# Patient Record
Sex: MALE | Race: WHITE
[De-identification: names, ages, dates, MRNs, and addresses within clinical notes are randomized per-mention and may not be internally consistent; named-entity substitution may affect disease eponyms.]

---

## 2018-10-30 ENCOUNTER — HOSPITAL ENCOUNTER (OUTPATIENT)
Age: 63
End: 2018-10-30
Payer: COMMERCIAL

## 2018-10-30 DIAGNOSIS — Z12.2: ICD-10-CM

## 2018-10-30 DIAGNOSIS — F17.210: Primary | ICD-10-CM

## 2018-10-30 PROCEDURE — G0297 LDCT FOR LUNG CA SCREEN: HCPCS

## 2023-01-18 PROBLEM — M17.10 PRIMARY OSTEOARTHRITIS OF ONE KNEE: Status: ACTIVE | Noted: 2023-01-18

## 2023-01-18 PROBLEM — J30.89 PERENNIAL ALLERGIC RHINITIS: Status: ACTIVE | Noted: 2023-01-18

## 2023-01-18 PROBLEM — E66.3 OVERWEIGHT WITH BODY MASS INDEX (BMI) OF 27 TO 27.9 IN ADULT: Status: ACTIVE | Noted: 2023-01-18

## 2023-01-18 PROBLEM — G44.86 CERVICOGENIC HEADACHE: Status: ACTIVE | Noted: 2023-01-18

## 2023-01-18 PROBLEM — R53.83 FATIGUE: Status: ACTIVE | Noted: 2023-01-18

## 2023-01-18 PROBLEM — T78.40XA ALLERGIC REACTION TO DRUG: Status: ACTIVE | Noted: 2023-01-18

## 2023-01-18 PROBLEM — M80.00XA AGE-RELATED OSTEOPOROSIS WITH CURRENT PATHOLOGICAL FRACTURE, UNSPECIFIED SITE, INITIAL ENCOUNTER FOR FRACTURE: Status: ACTIVE | Noted: 2023-01-18

## 2023-01-18 PROBLEM — K21.9 HIATAL HERNIA WITH GERD: Status: ACTIVE | Noted: 2023-01-18

## 2023-01-18 PROBLEM — I10 HTN (HYPERTENSION): Status: ACTIVE | Noted: 2023-01-18

## 2023-01-18 PROBLEM — Z95.1 HISTORY OF CORONARY ARTERY BYPASS GRAFT: Status: ACTIVE | Noted: 2023-01-18

## 2023-01-18 PROBLEM — M54.9 BACK PAIN: Status: ACTIVE | Noted: 2023-01-18

## 2023-01-18 PROBLEM — M06.4 INFLAMMATORY POLYARTHRITIS (MULTI): Status: ACTIVE | Noted: 2023-01-18

## 2023-01-18 PROBLEM — G43.909 MIGRAINE HEADACHE: Status: ACTIVE | Noted: 2023-01-18

## 2023-01-18 PROBLEM — L28.2 PRURITIC RASH: Status: ACTIVE | Noted: 2023-01-18

## 2023-01-18 PROBLEM — R13.19 ESOPHAGEAL DYSPHAGIA: Status: ACTIVE | Noted: 2023-01-18

## 2023-01-18 PROBLEM — K43.2 INCISIONAL HERNIA WITHOUT OBSTRUCTION OR GANGRENE: Status: ACTIVE | Noted: 2023-01-18

## 2023-01-18 PROBLEM — J45.909 ASTHMA, MODERATE (HHS-HCC): Status: ACTIVE | Noted: 2023-01-18

## 2023-01-18 PROBLEM — Z98.890 HISTORY OF SHOULDER SURGERY: Status: ACTIVE | Noted: 2023-01-18

## 2023-01-18 PROBLEM — K52.9 CHRONIC DIARRHEA: Status: ACTIVE | Noted: 2023-01-18

## 2023-01-18 PROBLEM — F41.8 TEST ANXIETY: Status: ACTIVE | Noted: 2023-01-18

## 2023-01-18 PROBLEM — K44.9 HIATAL HERNIA WITH GERD: Status: ACTIVE | Noted: 2023-01-18

## 2023-01-18 PROBLEM — J32.9 RECURRENT SINUSITIS: Status: ACTIVE | Noted: 2023-01-18

## 2023-01-18 PROBLEM — F41.1 GENERALIZED ANXIETY DISORDER: Status: ACTIVE | Noted: 2023-01-18

## 2023-01-18 PROBLEM — L23.9 ALLERGIC DERMATITIS: Status: ACTIVE | Noted: 2023-01-18

## 2023-01-18 PROBLEM — Z98.890 HISTORY OF REPAIR OF HIATAL HERNIA: Status: ACTIVE | Noted: 2023-01-18

## 2023-01-18 PROBLEM — M06.9 RHEUMATOID ARTHRITIS (MULTI): Status: ACTIVE | Noted: 2023-01-18

## 2023-01-18 PROBLEM — R11.0 NAUSEA IN ADULT: Status: ACTIVE | Noted: 2023-01-18

## 2023-01-18 PROBLEM — K42.9 UMBILICAL HERNIA: Status: ACTIVE | Noted: 2023-01-18

## 2023-01-18 PROBLEM — Z87.19 HISTORY OF REPAIR OF HIATAL HERNIA: Status: ACTIVE | Noted: 2023-01-18

## 2023-01-18 PROBLEM — I48.91 ATRIAL FIBRILLATION (MULTI): Status: ACTIVE | Noted: 2023-01-18

## 2023-01-18 PROBLEM — R60.0 BILATERAL EDEMA OF LOWER EXTREMITY: Status: ACTIVE | Noted: 2023-01-18

## 2023-01-18 RX ORDER — CETIRIZINE HYDROCHLORIDE 10 MG/1
10 TABLET ORAL DAILY
COMMUNITY

## 2023-01-18 RX ORDER — HYDROXYZINE HYDROCHLORIDE 10 MG/1
10-20 TABLET, FILM COATED ORAL 3 TIMES DAILY PRN
COMMUNITY
End: 2023-04-27 | Stop reason: ALTCHOICE

## 2023-01-18 RX ORDER — LIDOCAINE 50 MG/G
OINTMENT TOPICAL 3 TIMES DAILY PRN
COMMUNITY
End: 2023-10-26 | Stop reason: SDUPTHER

## 2023-01-18 RX ORDER — MONTELUKAST SODIUM 10 MG/1
10 TABLET ORAL NIGHTLY
COMMUNITY
End: 2023-12-20 | Stop reason: SDUPTHER

## 2023-01-18 RX ORDER — DICLOFENAC SODIUM 10 MG/G
12-16 GEL TOPICAL SEE ADMIN INSTRUCTIONS
COMMUNITY
End: 2023-03-23 | Stop reason: ALTCHOICE

## 2023-01-18 RX ORDER — HYDROCODONE BITARTRATE AND ACETAMINOPHEN 7.5; 325 MG/1; MG/1
1 TABLET ORAL 4 TIMES DAILY
COMMUNITY
End: 2023-10-26 | Stop reason: SDUPTHER

## 2023-01-18 RX ORDER — GABAPENTIN 600 MG/1
1200 TABLET ORAL NIGHTLY
COMMUNITY
End: 2023-04-03 | Stop reason: SDUPTHER

## 2023-01-18 RX ORDER — LOSARTAN POTASSIUM 50 MG/1
50 TABLET ORAL DAILY
COMMUNITY
End: 2023-10-21 | Stop reason: SDUPTHER

## 2023-01-18 RX ORDER — DILTIAZEM HYDROCHLORIDE 120 MG/1
120 CAPSULE, COATED, EXTENDED RELEASE ORAL DAILY
COMMUNITY
End: 2023-06-28 | Stop reason: SDUPTHER

## 2023-01-18 RX ORDER — AZITHROMYCIN 250 MG/1
250 TABLET, FILM COATED ORAL SEE ADMIN INSTRUCTIONS
COMMUNITY
End: 2023-03-23 | Stop reason: ALTCHOICE

## 2023-01-18 RX ORDER — DIGOXIN 125 MCG
125 TABLET ORAL DAILY
COMMUNITY
End: 2023-10-26 | Stop reason: ALTCHOICE

## 2023-01-18 RX ORDER — PREDNISONE 10 MG/1
TABLET ORAL SEE ADMIN INSTRUCTIONS
COMMUNITY
End: 2023-07-25 | Stop reason: ALTCHOICE

## 2023-01-18 RX ORDER — MORPHINE SULFATE 15 MG/1
15 TABLET ORAL 2 TIMES DAILY PRN
COMMUNITY
End: 2023-10-26 | Stop reason: SDUPTHER

## 2023-01-18 RX ORDER — FUROSEMIDE 20 MG/1
20 TABLET ORAL DAILY
COMMUNITY

## 2023-01-18 RX ORDER — ATORVASTATIN CALCIUM 40 MG/1
40 TABLET, FILM COATED ORAL NIGHTLY
COMMUNITY
End: 2023-04-27 | Stop reason: SDUPTHER

## 2023-01-18 RX ORDER — LANOLIN ALCOHOL/MO/W.PET/CERES
1000 CREAM (GRAM) TOPICAL DAILY
COMMUNITY

## 2023-01-18 RX ORDER — TRIAMCINOLONE ACETONIDE 5 MG/G
CREAM TOPICAL SEE ADMIN INSTRUCTIONS
COMMUNITY
End: 2023-10-26 | Stop reason: ALTCHOICE

## 2023-01-18 RX ORDER — ASPIRIN 81 MG/1
81 TABLET ORAL DAILY
COMMUNITY
End: 2023-12-20 | Stop reason: WASHOUT

## 2023-01-18 RX ORDER — DIAZEPAM 5 MG/1
5 TABLET ORAL DAILY PRN
COMMUNITY
End: 2023-04-27 | Stop reason: SDUPTHER

## 2023-01-18 RX ORDER — PROMETHAZINE HYDROCHLORIDE 25 MG/1
25 TABLET ORAL EVERY 6 HOURS PRN
COMMUNITY

## 2023-01-18 RX ORDER — HYDROCORTISONE 25 MG/G
CREAM TOPICAL 2 TIMES DAILY
COMMUNITY

## 2023-03-14 ENCOUNTER — TELEPHONE (OUTPATIENT)
Dept: PRIMARY CARE | Facility: CLINIC | Age: 68
End: 2023-03-14
Payer: MEDICARE

## 2023-03-14 DIAGNOSIS — R94.2 ABNORMAL POSITRON EMISSION TOMOGRAPHY (PET) OF LUNG: ICD-10-CM

## 2023-03-14 DIAGNOSIS — R91.1 LUNG NODULE SEEN ON IMAGING STUDY: Primary | ICD-10-CM

## 2023-03-16 ENCOUNTER — TELEPHONE (OUTPATIENT)
Dept: PRIMARY CARE | Facility: CLINIC | Age: 68
End: 2023-03-16
Payer: MEDICARE

## 2023-03-16 NOTE — TELEPHONE ENCOUNTER
Stuff for cancer doctors needs to go to [facility]. Asking for a call back.

## 2023-03-17 ENCOUNTER — TELEPHONE (OUTPATIENT)
Dept: PRIMARY CARE | Facility: CLINIC | Age: 68
End: 2023-03-17
Payer: MEDICARE

## 2023-03-17 NOTE — TELEPHONE ENCOUNTER
PC regarding the referral for thoracic CA. Dr Barton @ [facility] does not do this type of surgery but Vishnu Lizama @ [facility] does [phone]. Please call patient back to discuss

## 2023-03-20 ENCOUNTER — TELEPHONE (OUTPATIENT)
Dept: PRIMARY CARE | Facility: CLINIC | Age: 68
End: 2023-03-20
Payer: MEDICARE

## 2023-03-20 NOTE — TELEPHONE ENCOUNTER
Yesi from Dr. Lizama office needs copies of CT and PET images and reports. Asking for a call back at [phone].

## 2023-03-23 ENCOUNTER — OFFICE VISIT (OUTPATIENT)
Dept: PRIMARY CARE | Facility: CLINIC | Age: 68
End: 2023-03-23
Payer: MEDICARE

## 2023-03-23 VITALS
BODY MASS INDEX: 28.46 KG/M2 | DIASTOLIC BLOOD PRESSURE: 84 MMHG | OXYGEN SATURATION: 94 % | WEIGHT: 171 LBS | SYSTOLIC BLOOD PRESSURE: 142 MMHG | HEART RATE: 91 BPM

## 2023-03-23 DIAGNOSIS — Z13.31 POSITIVE DEPRESSION SCREENING: ICD-10-CM

## 2023-03-23 DIAGNOSIS — J01.41 ACUTE RECURRENT PANSINUSITIS: Primary | ICD-10-CM

## 2023-03-23 DIAGNOSIS — R91.1 NODULE OF UPPER LOBE OF RIGHT LUNG: ICD-10-CM

## 2023-03-23 PROCEDURE — 99213 OFFICE O/P EST LOW 20 MIN: CPT | Performed by: FAMILY MEDICINE

## 2023-03-23 PROCEDURE — 3079F DIAST BP 80-89 MM HG: CPT | Performed by: FAMILY MEDICINE

## 2023-03-23 PROCEDURE — 3077F SYST BP >= 140 MM HG: CPT | Performed by: FAMILY MEDICINE

## 2023-03-23 PROCEDURE — 1160F RVW MEDS BY RX/DR IN RCRD: CPT | Performed by: FAMILY MEDICINE

## 2023-03-23 PROCEDURE — 1159F MED LIST DOCD IN RCRD: CPT | Performed by: FAMILY MEDICINE

## 2023-03-23 PROCEDURE — 1036F TOBACCO NON-USER: CPT | Performed by: FAMILY MEDICINE

## 2023-03-23 RX ORDER — SUCRALFATE 1 G/1
1 TABLET ORAL
COMMUNITY
Start: 2023-02-14 | End: 2023-10-26 | Stop reason: SDUPTHER

## 2023-03-23 RX ORDER — AMOXICILLIN AND CLAVULANATE POTASSIUM 875; 125 MG/1; MG/1
875 TABLET, FILM COATED ORAL 2 TIMES DAILY
Qty: 20 TABLET | Refills: 0 | Status: SHIPPED | OUTPATIENT
Start: 2023-03-23 | End: 2023-04-02

## 2023-03-23 ASSESSMENT — PATIENT HEALTH QUESTIONNAIRE - PHQ9
3. TROUBLE FALLING OR STAYING ASLEEP: NEARLY EVERY DAY
10. IF YOU CHECKED OFF ANY PROBLEMS, HOW DIFFICULT HAVE THESE PROBLEMS MADE IT FOR YOU TO DO YOUR WORK, TAKE CARE OF THINGS AT HOME, OR GET ALONG WITH OTHER PEOPLE: NOT DIFFICULT AT ALL
5. POOR APPETITE OR OVEREATING: NEARLY EVERY DAY
SUM OF ALL RESPONSES TO PHQ QUESTIONS 1-9: 18
2. FEELING DOWN, DEPRESSED OR HOPELESS: NEARLY EVERY DAY
4. FEELING TIRED OR HAVING LITTLE ENERGY: NEARLY EVERY DAY
6. FEELING BAD ABOUT YOURSELF - OR THAT YOU ARE A FAILURE OR HAVE LET YOURSELF OR YOUR FAMILY DOWN: NOT AT ALL
8. MOVING OR SPEAKING SO SLOWLY THAT OTHER PEOPLE COULD HAVE NOTICED. OR THE OPPOSITE, BEING SO FIGETY OR RESTLESS THAT YOU HAVE BEEN MOVING AROUND A LOT MORE THAN USUAL: NOT AT ALL
9. THOUGHTS THAT YOU WOULD BE BETTER OFF DEAD, OR OF HURTING YOURSELF: SEVERAL DAYS
SUM OF ALL RESPONSES TO PHQ9 QUESTIONS 1 AND 2: 6
7. TROUBLE CONCENTRATING ON THINGS, SUCH AS READING THE NEWSPAPER OR WATCHING TELEVISION: MORE THAN HALF THE DAYS
1. LITTLE INTEREST OR PLEASURE IN DOING THINGS: NEARLY EVERY DAY

## 2023-03-23 NOTE — PROGRESS NOTES
Subjective   Patient ID: Remy Abdi is a 67 y.o. male who presents for Follow-up (Bringing up green phlegm, getting nosebleeds and coughed up blood yesterday).    HPI   Had CT after ablation due to cough and found to have 16 mm consolidatiion. Recommended PET scan. Also mild axillary adenopathy. He does not feel that . Has pain across the chest. Has productive cough from lung. Had LYNX procedure. Still having chest pain in upper chest with activity but also with rest. Having a lot of bloating. Occasionally dysphagia. Bending over he gets pain up into the chest. May need to have it removed. Will try Pantoprazole. No blood in stools, melena, hemoptysis     BP had been running high. Will get Headache and nose bleed.     3/23/23  The PET scan did come back suspicious so he is now to see a surgeon.  They have the information and will contact him. Coughing up green mucus and getting PND.  He has this a lot. Has to use Vernors to break up mucus.  Now with nose bleeds at times and coughed up some blood.  He is on Protonix for the chest pain but not helping. Also has the Carafate. Does have food stick if he does not chew enough.  So after her addresses the nodule will get the Lynx out.  The chest pain comes and goes.  Has been on antibiotics about couple months ago.  Getting sweats. So will treat for the sinusitis. Augmentin for 10 days.      Depression is PHQ-9 with score of 15 with all that is going on.  So she does have thoughts of better off dead. But not suicidal. Has been on Cymbalta and made him tired.  So would prefer not to do that for now. Feels he just needs to get through this.  Worried about his wife and her diabetes          Review of Systems    Objective   /84 (BP Location: Left arm, Patient Position: Sitting)   Pulse 91   Wt 77.6 kg (171 lb)   SpO2 94%   BMI 28.46 kg/m²     Physical Exam  Constitutional:       Appearance: Normal appearance.   HENT:      Head: Normocephalic.      Right Ear:  Tympanic membrane, ear canal and external ear normal.      Left Ear: Tympanic membrane, ear canal and external ear normal.      Nose: Nose normal.      Mouth/Throat:      Mouth: Mucous membranes are moist.      Pharynx: Oropharynx is clear.      Comments: Dentures   Eyes:      Extraocular Movements: Extraocular movements intact.      Conjunctiva/sclera: Conjunctivae normal.      Pupils: Pupils are equal, round, and reactive to light.   Cardiovascular:      Rate and Rhythm: Normal rate and regular rhythm.   Pulmonary:      Effort: Pulmonary effort is normal.      Breath sounds: Normal breath sounds.   Abdominal:      General: Abdomen is flat.      Palpations: Abdomen is soft.      Tenderness: There is abdominal tenderness (epigastrium).   Musculoskeletal:      Cervical back: Normal range of motion and neck supple.   Neurological:      General: No focal deficit present.      Mental Status: He is alert and oriented to person, place, and time.   Psychiatric:         Mood and Affect: Mood normal.         Behavior: Behavior normal.         Thought Content: Thought content normal.         Judgment: Judgment normal.         Assessment/Plan   Problem List Items Addressed This Visit       Positive depression screening     Other Visit Diagnoses       Acute recurrent pansinusitis    -  Primary    Nodule of upper lobe of right lung

## 2023-04-03 ENCOUNTER — TELEPHONE (OUTPATIENT)
Dept: PRIMARY CARE | Facility: CLINIC | Age: 68
End: 2023-04-03
Payer: MEDICARE

## 2023-04-03 DIAGNOSIS — I48.91 ATRIAL FIBRILLATION, UNSPECIFIED TYPE (MULTI): ICD-10-CM

## 2023-04-03 DIAGNOSIS — M54.9 BACK PAIN, UNSPECIFIED BACK LOCATION, UNSPECIFIED BACK PAIN LATERALITY, UNSPECIFIED CHRONICITY: ICD-10-CM

## 2023-04-03 RX ORDER — GABAPENTIN 600 MG/1
1200 TABLET ORAL NIGHTLY
Qty: 180 TABLET | Refills: 1 | Status: SHIPPED | OUTPATIENT
Start: 2023-04-03 | End: 2023-12-20 | Stop reason: SDUPTHER

## 2023-04-27 ENCOUNTER — OFFICE VISIT (OUTPATIENT)
Dept: PRIMARY CARE | Facility: CLINIC | Age: 68
End: 2023-04-27
Payer: MEDICARE

## 2023-04-27 VITALS
DIASTOLIC BLOOD PRESSURE: 78 MMHG | BODY MASS INDEX: 27.96 KG/M2 | WEIGHT: 168 LBS | HEART RATE: 84 BPM | SYSTOLIC BLOOD PRESSURE: 122 MMHG | OXYGEN SATURATION: 96 %

## 2023-04-27 DIAGNOSIS — M62.838 MUSCLE SPASM: ICD-10-CM

## 2023-04-27 DIAGNOSIS — J32.9 RECURRENT SINUSITIS: ICD-10-CM

## 2023-04-27 DIAGNOSIS — M05.79 RHEUMATOID ARTHRITIS INVOLVING MULTIPLE SITES WITH POSITIVE RHEUMATOID FACTOR (MULTI): ICD-10-CM

## 2023-04-27 DIAGNOSIS — G43.709 CHRONIC MIGRAINE WITHOUT AURA WITHOUT STATUS MIGRAINOSUS, NOT INTRACTABLE: ICD-10-CM

## 2023-04-27 DIAGNOSIS — C34.92 NSCLC OF LEFT LUNG (MULTI): ICD-10-CM

## 2023-04-27 DIAGNOSIS — F33.9 DEPRESSION, RECURRENT (CMS-HCC): Primary | ICD-10-CM

## 2023-04-27 DIAGNOSIS — K44.9 HIATAL HERNIA WITH GERD: ICD-10-CM

## 2023-04-27 DIAGNOSIS — K21.9 HIATAL HERNIA WITH GERD: ICD-10-CM

## 2023-04-27 DIAGNOSIS — E78.00 HYPERCHOLESTEROLEMIA: ICD-10-CM

## 2023-04-27 PROBLEM — Z13.31 POSITIVE DEPRESSION SCREENING: Status: RESOLVED | Noted: 2023-03-23 | Resolved: 2023-04-27

## 2023-04-27 PROCEDURE — 1159F MED LIST DOCD IN RCRD: CPT | Performed by: FAMILY MEDICINE

## 2023-04-27 PROCEDURE — 1160F RVW MEDS BY RX/DR IN RCRD: CPT | Performed by: FAMILY MEDICINE

## 2023-04-27 PROCEDURE — 99214 OFFICE O/P EST MOD 30 MIN: CPT | Performed by: FAMILY MEDICINE

## 2023-04-27 PROCEDURE — 3074F SYST BP LT 130 MM HG: CPT | Performed by: FAMILY MEDICINE

## 2023-04-27 PROCEDURE — 1036F TOBACCO NON-USER: CPT | Performed by: FAMILY MEDICINE

## 2023-04-27 PROCEDURE — 3078F DIAST BP <80 MM HG: CPT | Performed by: FAMILY MEDICINE

## 2023-04-27 RX ORDER — TRAZODONE HYDROCHLORIDE 50 MG/1
50 TABLET ORAL NIGHTLY
Qty: 30 TABLET | Refills: 1 | Status: SHIPPED | OUTPATIENT
Start: 2023-04-27 | End: 2023-07-25 | Stop reason: ALTCHOICE

## 2023-04-27 RX ORDER — DIAZEPAM 5 MG/1
5 TABLET ORAL DAILY PRN
Qty: 30 TABLET | Refills: 0 | Status: SHIPPED | OUTPATIENT
Start: 2023-04-27 | End: 2023-06-06 | Stop reason: SDUPTHER

## 2023-04-27 RX ORDER — NITROGLYCERIN 0.4 MG/1
0.4 TABLET SUBLINGUAL EVERY 5 MIN PRN
COMMUNITY
Start: 2022-06-22 | End: 2023-10-26 | Stop reason: SDUPTHER

## 2023-04-27 RX ORDER — ATORVASTATIN CALCIUM 40 MG/1
40 TABLET, FILM COATED ORAL NIGHTLY
Qty: 90 TABLET | Refills: 3 | Status: SHIPPED | OUTPATIENT
Start: 2023-04-27 | End: 2024-01-30 | Stop reason: SDUPTHER

## 2023-04-27 NOTE — PROGRESS NOTES
Subjective   Patient ID: Remy Abdi is a 67 y.o. male who presents for Follow-up (Discuss lung biopsy result).    HPI Had CT after ablation due to cough and found to have 16 mm consolidatiion. Recommended PET scan. Also mild axillary adenopathy. He does not feel that . Has pain across the chest. Has productive cough from lung. Had LYNX procedure. Still having chest pain in upper chest with activity but also with rest. Having a lot of bloating. Occasionally dysphagia. Bending over he gets pain up into the chest. May need to have it removed. Will try Pantoprazole. No blood in stools, melena, hemoptysis      BP had been running high. Will get Headache and nose bleed.      3/23/23  The PET scan did come back suspicious so he is now to see a surgeon.  They have the information and will contact him. Coughing up green mucus and getting PND.  He has this a lot. Has to use Vernors to break up mucus.  Now with nose bleeds at times and coughed up some blood.  He is on Protonix for the chest pain but not helping. Also has the Carafate. Does have food stick if he does not chew enough.  So after her addresses the nodule will get the Lynx out.  The chest pain comes and goes.  Has been on antibiotics about couple months ago.  Getting sweats. So will treat for the sinusitis. Augmentin for 10 days.       Depression is PHQ-9 with score of 15 with all that is going on.  So she does have thoughts of better off dead. But not suicidal. Has been on Cymbalta and made him tired.  So would prefer not to do that for now. Feels he just needs to get through this.  Worried about his wife and her diabetes      4/27/23  Had biopsy and now with cancer.  So to have lobectomy in the near future., Felt to be aggressive.. But not expecting radiation or chemo.  No resting well with all that is going on.  Would like to sleep better so will try him on Trazodone again.   Has Valium to use as needed. Hopefully less with the medication for sleep.     RA -  on steroids for last 3 days as knees and hips freezing up. Cannot take biologics as his sister developed cancer.  Was having issues.     Recurrent sinusitis - has had allergies acting up with the spring. On prednisone. Getting migraines more and had Nurtec to take    HH and GERD with repair -still needs to get LINX out.. Still pain and some dysphagia. Still hoarse.     Afib doing well.     UDS 12/5/22  CSA 12/5/22    Review of Systems    Objective   /78 (BP Location: Left arm, Patient Position: Sitting)   Pulse 84   Wt 76.2 kg (168 lb)   SpO2 96%   BMI 27.96 kg/m²     Physical Exam  Vitals reviewed.   Constitutional:       General: He is not in acute distress.     Appearance: Normal appearance.   HENT:      Head: Normocephalic.      Right Ear: Tympanic membrane normal.      Left Ear: Tympanic membrane normal.      Nose: Nose normal.      Mouth/Throat:      Pharynx: Oropharynx is clear.      Comments: Upper and lower dentures   Eyes:      Extraocular Movements: Extraocular movements intact.      Conjunctiva/sclera: Conjunctivae normal.      Pupils: Pupils are equal, round, and reactive to light.   Neck:      Vascular: No carotid bruit.   Cardiovascular:      Rate and Rhythm: Normal rate and regular rhythm.      Pulses: Normal pulses.      Heart sounds: Normal heart sounds. No murmur heard.  Pulmonary:      Effort: Pulmonary effort is normal. No respiratory distress.      Breath sounds: Normal breath sounds.   Abdominal:      General: Abdomen is flat. Bowel sounds are normal. There is no distension.      Palpations: Abdomen is soft. There is no mass.      Tenderness: There is abdominal tenderness (epigastric).   Musculoskeletal:      Cervical back: Normal range of motion and neck supple. No tenderness.   Lymphadenopathy:      Cervical: No cervical adenopathy.   Skin:     General: Skin is warm and dry.      Findings: No rash.   Neurological:      General: No focal deficit present.      Mental Status: He is  alert and oriented to person, place, and time.   Psychiatric:         Mood and Affect: Mood normal.         Thought Content: Thought content normal.         Judgment: Judgment normal.         Assessment/Plan   Problem List Items Addressed This Visit       Depression, recurrent (CMS/HCC) - Primary    Relevant Medications    traZODone (Desyrel) 50 mg tablet    Hiatal hernia with GERD    Migraine headache    NSCLC of left lung (CMS/HCC)    Recurrent sinusitis    Rheumatoid arthritis (CMS/HCC)     Other Visit Diagnoses       Muscle spasm        Relevant Medications    diazePAM (Valium) 5 mg tablet    Hypercholesterolemia        Relevant Medications    atorvastatin (Lipitor) 40 mg tablet

## 2023-05-13 PROBLEM — R14.2 GASEOUS REGURGITATION: Status: ACTIVE | Noted: 2019-10-21

## 2023-05-15 ENCOUNTER — OFFICE VISIT (OUTPATIENT)
Dept: PRIMARY CARE | Facility: CLINIC | Age: 68
End: 2023-05-15
Payer: MEDICARE

## 2023-05-15 VITALS
WEIGHT: 167 LBS | HEART RATE: 77 BPM | SYSTOLIC BLOOD PRESSURE: 132 MMHG | BODY MASS INDEX: 27.79 KG/M2 | DIASTOLIC BLOOD PRESSURE: 86 MMHG | OXYGEN SATURATION: 97 %

## 2023-05-15 DIAGNOSIS — M19.011 ARTHRITIS OF RIGHT ACROMIOCLAVICULAR JOINT: Primary | ICD-10-CM

## 2023-05-15 PROCEDURE — 20610 DRAIN/INJ JOINT/BURSA W/O US: CPT | Performed by: FAMILY MEDICINE

## 2023-05-15 PROCEDURE — 1160F RVW MEDS BY RX/DR IN RCRD: CPT | Performed by: FAMILY MEDICINE

## 2023-05-15 PROCEDURE — 3075F SYST BP GE 130 - 139MM HG: CPT | Performed by: FAMILY MEDICINE

## 2023-05-15 PROCEDURE — 99212 OFFICE O/P EST SF 10 MIN: CPT | Performed by: FAMILY MEDICINE

## 2023-05-15 PROCEDURE — 1159F MED LIST DOCD IN RCRD: CPT | Performed by: FAMILY MEDICINE

## 2023-05-15 PROCEDURE — 3079F DIAST BP 80-89 MM HG: CPT | Performed by: FAMILY MEDICINE

## 2023-05-15 PROCEDURE — 1036F TOBACCO NON-USER: CPT | Performed by: FAMILY MEDICINE

## 2023-05-15 RX ORDER — TRIAMCINOLONE ACETONIDE 40 MG/ML
40 INJECTION, SUSPENSION INTRA-ARTICULAR; INTRAMUSCULAR
Status: COMPLETED | OUTPATIENT
Start: 2023-05-15 | End: 2023-05-15

## 2023-05-15 RX ORDER — LIDOCAINE HYDROCHLORIDE 10 MG/ML
2 INJECTION INFILTRATION; PERINEURAL
Status: COMPLETED | OUTPATIENT
Start: 2023-05-15 | End: 2023-05-15

## 2023-05-15 RX ADMIN — LIDOCAINE HYDROCHLORIDE 2 ML: 10 INJECTION INFILTRATION; PERINEURAL at 10:02

## 2023-05-15 RX ADMIN — TRIAMCINOLONE ACETONIDE 40 MG: 40 INJECTION, SUSPENSION INTRA-ARTICULAR; INTRAMUSCULAR at 10:02

## 2023-05-15 ASSESSMENT — ENCOUNTER SYMPTOMS: PAIN: 1

## 2023-05-15 NOTE — PROGRESS NOTES
Subjective   Patient ID: Remy Abdi is a 67 y.o. male who presents for Pain (Problems with right shoulder pain).    Pain     Having a lot of pain in the right shoulder.  Had an injection 4 days ago which has not helped. Needs replacement on the left. Has been taking low dose prednisone. Pain is more anterior. Has had rotator cuff on left side. Pain with abduction     Review of Systems    Objective   /86 (BP Location: Left arm, Patient Position: Sitting)   Pulse 77   Wt 75.8 kg (167 lb)   SpO2 97%   BMI 27.79 kg/m²     Physical Exam He has pain in right shoulder with abduction.  Mildly positive Grajeda and Neer.  Tenderness though is right over the AC joint.  No tenderness over the biceps tendon.  No swelling or redness.Patient ID: Remy Abdi is a 67 y.o. male.    Joint Injection Large/Arthrocentesis (Right AC joint) on 5/15/2023 10:02 AM  Indications: pain  Details: superior approach  Medications: 40 mg triamcinolone acetonide 40 mg/mL; 2 mL lidocaine 10 mg/mL (1 %)  Outcome: tolerated well, no immediate complications  Procedure, treatment alternatives, risks and benefits explained, specific risks discussed. Patient was prepped and draped in the usual sterile fashion.         Assessment/Plan   Problem List Items Addressed This Visit    None  Visit Diagnoses       Arthritis of right acromioclavicular joint    -  Primary    Relevant Medications    lidocaine (Xylocaine) 10 mg/mL (1 %) injection 2 mL (Completed)    triamcinolone acetonide (Kenalog-40) injection 40 mg (Completed)    Other Relevant Orders    Joint Injection Large/Arthrocentesis (Completed)

## 2023-06-02 PROBLEM — C34.32: Status: ACTIVE | Noted: 2023-05-25

## 2023-06-02 RX ORDER — AMOXICILLIN 250 MG
1 CAPSULE ORAL 2 TIMES DAILY
Qty: 60 TABLET | Refills: 0 | COMMUNITY
Start: 2023-05-26 | End: 2023-06-25

## 2023-06-02 RX ORDER — NALOXONE HYDROCHLORIDE 4 MG/.1ML
4 SPRAY NASAL AS NEEDED
COMMUNITY
Start: 2023-05-26

## 2023-06-06 ENCOUNTER — TELEPHONE (OUTPATIENT)
Dept: PRIMARY CARE | Facility: CLINIC | Age: 68
End: 2023-06-06
Payer: MEDICARE

## 2023-06-06 DIAGNOSIS — M62.838 MUSCLE SPASM: ICD-10-CM

## 2023-06-06 RX ORDER — DIAZEPAM 5 MG/1
5 TABLET ORAL DAILY PRN
Qty: 30 TABLET | Refills: 0 | Status: SHIPPED | OUTPATIENT
Start: 2023-06-06 | End: 2023-07-25 | Stop reason: SDUPTHER

## 2023-06-06 NOTE — TELEPHONE ENCOUNTER
Needing refill of diazepam sent to [facility] pharmacy.   
Rx proposed   
no peripheral edema/no orthopnea/no chest pain/no palpitations/no dyspnea on exertion

## 2023-06-28 ENCOUNTER — OFFICE VISIT (OUTPATIENT)
Dept: PRIMARY CARE | Facility: CLINIC | Age: 68
End: 2023-06-28
Payer: MEDICARE

## 2023-06-28 VITALS
OXYGEN SATURATION: 95 % | HEART RATE: 87 BPM | SYSTOLIC BLOOD PRESSURE: 128 MMHG | DIASTOLIC BLOOD PRESSURE: 76 MMHG | BODY MASS INDEX: 27.62 KG/M2 | WEIGHT: 166 LBS
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
## 2023-06-28 DIAGNOSIS — I48.91 ATRIAL FIBRILLATION, UNSPECIFIED TYPE (MULTI): ICD-10-CM

## 2023-06-28 DIAGNOSIS — F33.9 DEPRESSION, RECURRENT (CMS-HCC): Primary | ICD-10-CM

## 2023-06-28 DIAGNOSIS — C34.92 NSCLC OF LEFT LUNG (MULTI): ICD-10-CM

## 2023-06-28 DIAGNOSIS — S23.41XA SPRAIN OF COSTAL CARTILAGE, INITIAL ENCOUNTER: ICD-10-CM

## 2023-06-28 PROCEDURE — 1160F RVW MEDS BY RX/DR IN RCRD: CPT | Performed by: FAMILY MEDICINE

## 2023-06-28 PROCEDURE — 99214 OFFICE O/P EST MOD 30 MIN: CPT | Performed by: FAMILY MEDICINE

## 2023-06-28 PROCEDURE — 1159F MED LIST DOCD IN RCRD: CPT | Performed by: FAMILY MEDICINE

## 2023-06-28 PROCEDURE — 3074F SYST BP LT 130 MM HG: CPT | Performed by: FAMILY MEDICINE

## 2023-06-28 PROCEDURE — 3078F DIAST BP <80 MM HG: CPT | Performed by: FAMILY MEDICINE

## 2023-06-28 PROCEDURE — 1036F TOBACCO NON-USER: CPT | Performed by: FAMILY MEDICINE

## 2023-06-28 RX ORDER — DILTIAZEM HYDROCHLORIDE 120 MG/1
120 CAPSULE, COATED, EXTENDED RELEASE ORAL DAILY
Qty: 90 CAPSULE | Refills: 2 | Status: SHIPPED | OUTPATIENT
Start: 2023-06-28

## 2023-06-28 RX ORDER — DULOXETIN HYDROCHLORIDE 30 MG/1
CAPSULE, DELAYED RELEASE ORAL
Qty: 42 CAPSULE | Refills: 0 | Status: SHIPPED | OUTPATIENT
Start: 2023-06-28 | End: 2023-10-26 | Stop reason: ALTCHOICE

## 2023-06-28 NOTE — PROGRESS NOTES
Subjective   Patient ID: Remy Abdi is a 67 y.o. male who presents for Follow-up.    HPI  2/223/23   Had CT after ablation due to cough and found to have 16 mm consolidatiion. Recommended PET scan. Also mild axillary adenopathy. He does not feel that . Has pain across the chest. Has productive cough from lung. Had LYNX procedure. Still having chest pain in upper chest with activity but also with rest. Having a lot of bloating. Occasionally dysphagia. Bending over he gets pain up into the chest. May need to have it removed. Will try Pantoprazole. No blood in stools, melena, hemoptysis      BP had been running high. Will get Headache and nose bleed.      3/23/23  The PET scan did come back suspicious so he is now to see a surgeon.  They have the information and will contact him. Coughing up green mucus and getting PND.  He has this a lot. Has to use Vernors to break up mucus.  Now with nose bleeds at times and coughed up some blood.  He is on Protonix for the chest pain but not helping. Also has the Carafate. Does have food stick if he does not chew enough.  So after her addresses the nodule will get the Lynx out.  The chest pain comes and goes.  Has been on antibiotics about couple months ago.  Getting sweats. So will treat for the sinusitis. Augmentin for 10 days.       Depression is PHQ-9 with score of 15 with all that is going on.  So she does have thoughts of better off dead. But not suicidal. Has been on Cymbalta and made him tired.  So would prefer not to do that for now. Feels he just needs to get through this.  Worried about his wife and her diabetes       4/27/23  Had biopsy and now with cancer.  So to have lobectomy in the near future., Felt to be aggressive.. But not expecting radiation or chemo.  No resting well with all that is going on.  Would like to sleep better so will try him on Trazodone again.   Has Valium to use as needed. Hopefully less with the medication for sleep.      RA - on steroids for  last 3 days as knees and hips freezing up. Cannot take biologics as his sister developed cancer.  Was having issues.      Recurrent sinusitis - has had allergies acting up with the spring. On prednisone. Getting migraines more and had Nurtec to take     HH and GERD with repair -still needs to get LINX out.. Still pain and some dysphagia. Still hoarse.      Afib doing well.     6/28/23  Has surgery by scope and not able to remove the cancer. So to have radiation. To start in about 2 weeks after healing.   He had a fall about 3 days ago. Walking off of the deck and one foot caught on the floor. Fell down on palms of hands, knee, abdomen. Now cannot bend down due to pain in the ribs. Has abrasion on knees. They do not hurt much. Hypothenar discolored. No abrasion. Does have one on the left forearm due to the watch. Mainly sore right ribs.    His wife would like him treated for anxiety as he is more irritable. Sad and blue with all that has happened.  Valium does help with the rib spasm and restlessness. Trazodone helps him sleep but gives hangover. Cymbalta got to where it was not working in the past. Does have intolerance to multiple SSRIs so will go back to Cymbalta.     Still needs to have LINX out but will need to have radiation.        UDS 12/5/22  CSA 12/5/22    Review of Systems    Objective   /76 (BP Location: Left arm, Patient Position: Sitting)   Pulse 87   Wt 75.3 kg (166 lb)   SpO2 95%   BMI 27.62 kg/m²     Physical Exam  Constitutional:       Appearance: Normal appearance. He is normal weight.   Cardiovascular:      Rate and Rhythm: Normal rate and regular rhythm.      Pulses: Normal pulses.      Heart sounds: No murmur heard.  Pulmonary:      Effort: Pulmonary effort is normal. No respiratory distress.      Breath sounds: Normal breath sounds. No wheezing.   Chest:      Chest wall: Tenderness (right rib margin. No deformity) present.   Abdominal:      General: Abdomen is flat. There is no  distension.      Palpations: Abdomen is soft. There is no mass.      Tenderness: There is no abdominal tenderness.   Skin:     General: Skin is warm and dry.      Comments: Well healed scars on the left chest    Neurological:      Mental Status: He is alert.         Assessment/Plan   Problem List Items Addressed This Visit       Atrial fibrillation (CMS/HCC)    Relevant Medications    dilTIAZem CD (Cardizem CD) 120 mg 24 hr capsule    Depression, recurrent (CMS/HCC) - Primary    Relevant Medications    DULoxetine (Cymbalta) 30 mg DR capsule    Other Relevant Orders    Follow Up In Primary Care - Established    NSCLC of left lung (CMS/HCC)     Other Visit Diagnoses       Fall, initial encounter        Sprain of costal cartilage, initial encounter

## 2023-07-10 ENCOUNTER — TELEPHONE (OUTPATIENT)
Dept: PRIMARY CARE | Facility: CLINIC | Age: 68
End: 2023-07-10
Payer: MEDICARE

## 2023-07-10 NOTE — TELEPHONE ENCOUNTER
Pt dropped off paperwork that needs to be completed for his handicap plates. Birthday is on [birth year], if we could have these forms completed today or tomorrow that would be great.    Please call pt at [phone] when completed.

## 2023-07-25 ENCOUNTER — OFFICE VISIT (OUTPATIENT)
Dept: PRIMARY CARE | Facility: CLINIC | Age: 68
End: 2023-07-25
Payer: MEDICARE

## 2023-07-25 VITALS
SYSTOLIC BLOOD PRESSURE: 122 MMHG | BODY MASS INDEX: 27.62 KG/M2 | WEIGHT: 166 LBS | OXYGEN SATURATION: 96 % | DIASTOLIC BLOOD PRESSURE: 82 MMHG | HEART RATE: 87 BPM

## 2023-07-25 DIAGNOSIS — F41.1 GENERALIZED ANXIETY DISORDER: ICD-10-CM

## 2023-07-25 DIAGNOSIS — K21.9 HIATAL HERNIA WITH GERD: Primary | ICD-10-CM

## 2023-07-25 DIAGNOSIS — M62.838 MUSCLE SPASM: ICD-10-CM

## 2023-07-25 DIAGNOSIS — C34.92 NSCLC OF LEFT LUNG (MULTI): ICD-10-CM

## 2023-07-25 DIAGNOSIS — I48.0 PAROXYSMAL ATRIAL FIBRILLATION (MULTI): ICD-10-CM

## 2023-07-25 DIAGNOSIS — K44.9 HIATAL HERNIA WITH GERD: Primary | ICD-10-CM

## 2023-07-25 DIAGNOSIS — F33.9 DEPRESSION, RECURRENT (CMS-HCC): ICD-10-CM

## 2023-07-25 PROCEDURE — 99214 OFFICE O/P EST MOD 30 MIN: CPT | Performed by: FAMILY MEDICINE

## 2023-07-25 PROCEDURE — 1160F RVW MEDS BY RX/DR IN RCRD: CPT | Performed by: FAMILY MEDICINE

## 2023-07-25 PROCEDURE — 3074F SYST BP LT 130 MM HG: CPT | Performed by: FAMILY MEDICINE

## 2023-07-25 PROCEDURE — 3079F DIAST BP 80-89 MM HG: CPT | Performed by: FAMILY MEDICINE

## 2023-07-25 PROCEDURE — 1159F MED LIST DOCD IN RCRD: CPT | Performed by: FAMILY MEDICINE

## 2023-07-25 PROCEDURE — 1036F TOBACCO NON-USER: CPT | Performed by: FAMILY MEDICINE

## 2023-07-25 RX ORDER — DIAZEPAM 5 MG/1
5 TABLET ORAL DAILY PRN
Qty: 30 TABLET | Refills: 0 | Status: SHIPPED | OUTPATIENT
Start: 2023-07-25 | End: 2023-09-08 | Stop reason: SDUPTHER

## 2023-07-25 RX ORDER — PANTOPRAZOLE SODIUM 40 MG/1
40 TABLET, DELAYED RELEASE ORAL DAILY
Qty: 90 TABLET | Refills: 3 | Status: SHIPPED | OUTPATIENT
Start: 2023-07-25 | End: 2024-01-30 | Stop reason: SDUPTHER

## 2023-07-25 NOTE — PROGRESS NOTES
Subjective   Patient ID: Remy Abdi is a 68 y.o. male who presents for Follow-up.    HPI   2/22/23   Had CT after ablation due to cough and found to have 16 mm consolidatiion. Recommended PET scan. Also mild axillary adenopathy. He does not feel that . Has pain across the chest. Has productive cough from lung. Had LYNX procedure. Still having chest pain in upper chest with activity but also with rest. Having a lot of bloating. Occasionally dysphagia. Bending over he gets pain up into the chest. May need to have it removed. Will try Pantoprazole. No blood in stools, melena, hemoptysis      BP had been running high. Will get Headache and nose bleed.      3/23/23  The PET scan did come back suspicious so he is now to see a surgeon.  They have the information and will contact him. Coughing up green mucus and getting PND.  He has this a lot. Has to use Vernors to break up mucus.  Now with nose bleeds at times and coughed up some blood.  He is on Protonix for the chest pain but not helping. Also has the Carafate. Does have food stick if he does not chew enough.  So after her addresses the nodule will get the Lynx out.  The chest pain comes and goes.  Has been on antibiotics about couple months ago.  Getting sweats. So will treat for the sinusitis. Augmentin for 10 days.       Depression is PHQ-9 with score of 15 with all that is going on.  So she does have thoughts of better off dead. But not suicidal. Has been on Cymbalta and made him tired.  So would prefer not to do that for now. Feels he just needs to get through this.  Worried about his wife and her diabetes       4/27/23  Had biopsy and now with cancer.  So to have lobectomy in the near future., Felt to be aggressive.. But not expecting radiation or chemo.  No resting well with all that is going on.  Would like to sleep better so will try him on Trazodone again.   Has Valium to use as needed. Hopefully less with the medication for sleep.      RA - on steroids for  last 3 days as knees and hips freezing up. Cannot take biologics as his sister developed cancer.  Was having issues.      Recurrent sinusitis - has had allergies acting up with the spring. On prednisone. Getting migraines more and had Nurtec to take     HH and GERD with repair -still needs to get LINX out.. Still pain and some dysphagia. Still hoarse.      Afib doing well.      6/28/23  Has surgery by scope and not able to remove the cancer. So to have radiation. To start in about 2 weeks after healing.   He had a fall about 3 days ago. Walking off of the deck and one foot caught on the floor. Fell down on palms of hands, knee, abdomen. Now cannot bend down due to pain in the ribs. Has abrasion on knees. They do not hurt much. Hypothenar discolored. No abrasion. Does have one on the left forearm due to the watch. Mainly sore right ribs.    His wife would like him treated for anxiety as he is more irritable. Sad and blue with all that has happened.  Valium does help with the rib spasm and restlessness. Trazodone helps him sleep but gives hangover. Cymbalta got to where it was not working in the past. Does have intolerance to multiple SSRIs so will go back to Cymbalta.     Still needs to have LINX out but will need to have radiation.     7/25/23  Needs refill on Valium. Does not take daily. As needed to relax for testing or procedures or dealing with diagnoses. Works well for this and spasms with injuries ,. OARRS consistent. Does not drive.   Done with radiation. To have scan in 3 months. Very tired with this. The pain above is doing better. Off Trazodone.  Off Cymbalta as it was making him tired. May try again after the effects of the radiation wear off.   Has GERD symptoms. Protonix and Sucralfate help.      Afib doing well.. No chest pain other than ribs. No further falls        UDS 12/5/22  CSA 12/5/22  I have personally reviewed the patients OARRS report. I have considered the risks of abuse, addiction and  diversion. I believe it is clinically appropriate to continue to prescribe this medication.     Review of Systems    Objective   /82 (BP Location: Left arm, Patient Position: Sitting)   Pulse 87   Wt 75.3 kg (166 lb)   SpO2 96%   BMI 27.62 kg/m²     Physical Exam  Constitutional:       General: He is not in acute distress.     Appearance: Normal appearance.   Cardiovascular:      Rate and Rhythm: Normal rate and regular rhythm.      Heart sounds: No murmur heard.  Pulmonary:      Effort: Pulmonary effort is normal. No respiratory distress.      Breath sounds: Normal breath sounds. No wheezing.   Chest:      Chest wall: No tenderness.   Musculoskeletal:      Right lower leg: No edema.      Left lower leg: No edema.   Neurological:      Mental Status: He is alert.   Psychiatric:         Mood and Affect: Mood normal.         Behavior: Behavior normal.         Thought Content: Thought content normal.         Judgment: Judgment normal.           Assessment/Plan   Problem List Items Addressed This Visit       Atrial fibrillation (CMS/HCC)    Depression, recurrent (CMS/HCC)    Generalized anxiety disorder    Hiatal hernia with GERD - Primary    Relevant Medications    pantoprazole (ProtoNix) 40 mg EC tablet    Other Relevant Orders    Follow Up In Primary Care - Established    NSCLC of left lung (CMS/HCC)     Other Visit Diagnoses       Muscle spasm        Relevant Medications    diazePAM (Valium) 5 mg tablet

## 2023-09-08 ENCOUNTER — TELEPHONE (OUTPATIENT)
Dept: PRIMARY CARE | Facility: CLINIC | Age: 68
End: 2023-09-08
Payer: MEDICARE

## 2023-09-08 DIAGNOSIS — M62.838 MUSCLE SPASM: ICD-10-CM

## 2023-09-08 RX ORDER — DIAZEPAM 5 MG/1
5 TABLET ORAL DAILY PRN
Qty: 30 TABLET | Refills: 0 | Status: SHIPPED | OUTPATIENT
Start: 2023-09-08 | End: 2023-09-12 | Stop reason: SDUPTHER

## 2023-09-12 ENCOUNTER — TELEPHONE (OUTPATIENT)
Dept: PRIMARY CARE | Facility: CLINIC | Age: 68
End: 2023-09-12
Payer: MEDICARE

## 2023-09-12 DIAGNOSIS — M62.838 MUSCLE SPASM: ICD-10-CM

## 2023-09-12 RX ORDER — DIAZEPAM 5 MG/1
5 TABLET ORAL DAILY PRN
Qty: 30 TABLET | Refills: 0 | Status: SHIPPED | OUTPATIENT
Start: 2023-09-12 | End: 2023-09-12

## 2023-09-12 NOTE — TELEPHONE ENCOUNTER
Needs a refill on diazepam 5mg sent to [facility] pharmacy- states he called a week ago and didn't receive- will need asap

## 2023-10-21 ENCOUNTER — PHARMACY VISIT (OUTPATIENT)
Dept: PHARMACY | Facility: CLINIC | Age: 68
End: 2023-10-21
Payer: MEDICARE

## 2023-10-21 ENCOUNTER — TELEPHONE (OUTPATIENT)
Dept: PRIMARY CARE | Facility: CLINIC | Age: 68
End: 2023-10-21
Payer: MEDICARE

## 2023-10-21 DIAGNOSIS — I10 PRIMARY HYPERTENSION: Primary | ICD-10-CM

## 2023-10-21 DIAGNOSIS — I10 PRIMARY HYPERTENSION: ICD-10-CM

## 2023-10-21 PROCEDURE — RXMED WILLOW AMBULATORY MEDICATION CHARGE

## 2023-10-21 RX ORDER — LOSARTAN POTASSIUM 50 MG/1
50 TABLET ORAL DAILY
Qty: 90 TABLET | Refills: 3 | Status: SHIPPED | OUTPATIENT
Start: 2023-10-21 | End: 2023-10-26 | Stop reason: SDUPTHER

## 2023-10-21 RX ORDER — LOSARTAN POTASSIUM 50 MG/1
50 TABLET ORAL DAILY
Qty: 7 TABLET | Refills: 0 | Status: SHIPPED | OUTPATIENT
Start: 2023-10-21 | End: 2023-10-26 | Stop reason: SDUPTHER

## 2023-10-23 ENCOUNTER — PHARMACY VISIT (OUTPATIENT)
Dept: PHARMACY | Facility: CLINIC | Age: 68
End: 2023-10-23
Payer: MEDICARE

## 2023-10-23 PROCEDURE — RXMED WILLOW AMBULATORY MEDICATION CHARGE

## 2023-10-23 PROCEDURE — RXOTC WILLOW AMBULATORY OTC CHARGE

## 2023-10-23 RX ORDER — MORPHINE SULFATE 15 MG/1
TABLET, FILM COATED, EXTENDED RELEASE ORAL
Qty: 56 TABLET | Refills: 0 | OUTPATIENT
Start: 2023-10-23 | End: 2023-10-26 | Stop reason: SDUPTHER

## 2023-10-23 RX ORDER — HYDROCODONE BITARTRATE AND ACETAMINOPHEN 7.5; 325 MG/1; MG/1
TABLET ORAL
Qty: 84 TABLET | Refills: 0 | OUTPATIENT
Start: 2023-10-23 | End: 2023-10-26 | Stop reason: SDUPTHER

## 2023-10-23 RX ORDER — HYDROCODONE BITARTRATE AND ACETAMINOPHEN 7.5; 325 MG/1; MG/1
TABLET ORAL
Qty: 84 TABLET | Refills: 0 | OUTPATIENT
Start: 2023-11-20 | End: 2023-12-20 | Stop reason: WASHOUT

## 2023-10-23 RX ORDER — MORPHINE SULFATE 15 MG/1
TABLET, FILM COATED, EXTENDED RELEASE ORAL
Qty: 56 TABLET | Refills: 0 | OUTPATIENT
Start: 2023-11-20 | End: 2023-12-20 | Stop reason: WASHOUT

## 2023-10-26 ENCOUNTER — OFFICE VISIT (OUTPATIENT)
Dept: PRIMARY CARE | Facility: CLINIC | Age: 68
End: 2023-10-26
Payer: MEDICARE

## 2023-10-26 ENCOUNTER — PHARMACY VISIT (OUTPATIENT)
Dept: PHARMACY | Facility: CLINIC | Age: 68
End: 2023-10-26
Payer: MEDICARE

## 2023-10-26 VITALS
WEIGHT: 170 LBS | HEART RATE: 84 BPM | SYSTOLIC BLOOD PRESSURE: 120 MMHG | BODY MASS INDEX: 28.29 KG/M2 | OXYGEN SATURATION: 95 % | DIASTOLIC BLOOD PRESSURE: 74 MMHG

## 2023-10-26 DIAGNOSIS — Z98.890 HISTORY OF REPAIR OF HIATAL HERNIA: ICD-10-CM

## 2023-10-26 DIAGNOSIS — Z00.00 ROUTINE GENERAL MEDICAL EXAMINATION AT HEALTH CARE FACILITY: Primary | ICD-10-CM

## 2023-10-26 DIAGNOSIS — J30.89 PERENNIAL ALLERGIC RHINITIS: ICD-10-CM

## 2023-10-26 DIAGNOSIS — I48.0 PAROXYSMAL ATRIAL FIBRILLATION (MULTI): ICD-10-CM

## 2023-10-26 DIAGNOSIS — C34.32 CANCER OF LOWER LOBE OF LEFT LUNG (MULTI): ICD-10-CM

## 2023-10-26 DIAGNOSIS — Z87.19 HISTORY OF REPAIR OF HIATAL HERNIA: ICD-10-CM

## 2023-10-26 DIAGNOSIS — F33.9 DEPRESSION, RECURRENT (CMS-HCC): ICD-10-CM

## 2023-10-26 DIAGNOSIS — M06.9 RHEUMATOID ARTHRITIS INVOLVING MULTIPLE SITES, UNSPECIFIED WHETHER RHEUMATOID FACTOR PRESENT (MULTI): ICD-10-CM

## 2023-10-26 DIAGNOSIS — C34.92 NSCLC OF LEFT LUNG (MULTI): ICD-10-CM

## 2023-10-26 DIAGNOSIS — Z23 IMMUNIZATION DUE: ICD-10-CM

## 2023-10-26 DIAGNOSIS — K83.8 COMMON BILE DUCT DILATION: ICD-10-CM

## 2023-10-26 DIAGNOSIS — J45.41 MODERATE PERSISTENT ASTHMA WITH ACUTE EXACERBATION (HHS-HCC): ICD-10-CM

## 2023-10-26 DIAGNOSIS — M06.4 INFLAMMATORY POLYARTHRITIS (MULTI): ICD-10-CM

## 2023-10-26 DIAGNOSIS — R60.0 BILATERAL EDEMA OF LOWER EXTREMITY: ICD-10-CM

## 2023-10-26 DIAGNOSIS — I10 PRIMARY HYPERTENSION: ICD-10-CM

## 2023-10-26 DIAGNOSIS — E66.3 OVERWEIGHT WITH BODY MASS INDEX (BMI) OF 28 TO 28.9 IN ADULT: ICD-10-CM

## 2023-10-26 DIAGNOSIS — K44.9 HIATAL HERNIA WITH GERD: ICD-10-CM

## 2023-10-26 DIAGNOSIS — K21.9 HIATAL HERNIA WITH GERD: ICD-10-CM

## 2023-10-26 PROBLEM — R11.0 NAUSEA IN ADULT: Status: RESOLVED | Noted: 2023-01-18 | Resolved: 2023-10-26

## 2023-10-26 PROBLEM — J45.901 MODERATE ASTHMA WITH ACUTE EXACERBATION (HHS-HCC): Status: ACTIVE | Noted: 2023-01-18

## 2023-10-26 PROCEDURE — 3078F DIAST BP <80 MM HG: CPT | Performed by: FAMILY MEDICINE

## 2023-10-26 PROCEDURE — 1159F MED LIST DOCD IN RCRD: CPT | Performed by: FAMILY MEDICINE

## 2023-10-26 PROCEDURE — RXMED WILLOW AMBULATORY MEDICATION CHARGE

## 2023-10-26 PROCEDURE — 90662 IIV NO PRSV INCREASED AG IM: CPT | Performed by: FAMILY MEDICINE

## 2023-10-26 PROCEDURE — G0008 ADMIN INFLUENZA VIRUS VAC: HCPCS | Performed by: FAMILY MEDICINE

## 2023-10-26 PROCEDURE — 1036F TOBACCO NON-USER: CPT | Performed by: FAMILY MEDICINE

## 2023-10-26 PROCEDURE — 1170F FXNL STATUS ASSESSED: CPT | Performed by: FAMILY MEDICINE

## 2023-10-26 PROCEDURE — 3074F SYST BP LT 130 MM HG: CPT | Performed by: FAMILY MEDICINE

## 2023-10-26 PROCEDURE — G0439 PPPS, SUBSEQ VISIT: HCPCS | Performed by: FAMILY MEDICINE

## 2023-10-26 PROCEDURE — 3008F BODY MASS INDEX DOCD: CPT | Performed by: FAMILY MEDICINE

## 2023-10-26 PROCEDURE — 99215 OFFICE O/P EST HI 40 MIN: CPT | Performed by: FAMILY MEDICINE

## 2023-10-26 PROCEDURE — 1160F RVW MEDS BY RX/DR IN RCRD: CPT | Performed by: FAMILY MEDICINE

## 2023-10-26 RX ORDER — LOSARTAN POTASSIUM 50 MG/1
50 TABLET ORAL DAILY
Qty: 30 TABLET | Refills: 0 | Status: SHIPPED | OUTPATIENT
Start: 2023-10-26 | End: 2023-11-03 | Stop reason: SDUPTHER

## 2023-10-26 RX ORDER — SUCRALFATE 1 G/1
1 TABLET ORAL 4 TIMES DAILY
Qty: 360 TABLET | Refills: 3 | Status: SHIPPED | OUTPATIENT
Start: 2023-10-26 | End: 2023-12-20 | Stop reason: WASHOUT

## 2023-10-26 RX ORDER — AZITHROMYCIN 250 MG/1
TABLET, FILM COATED ORAL
Qty: 6 TABLET | Refills: 0 | Status: SHIPPED | OUTPATIENT
Start: 2023-10-26 | End: 2023-10-31

## 2023-10-26 ASSESSMENT — PATIENT HEALTH QUESTIONNAIRE - PHQ9
1. LITTLE INTEREST OR PLEASURE IN DOING THINGS: SEVERAL DAYS
2. FEELING DOWN, DEPRESSED OR HOPELESS: NOT AT ALL
SUM OF ALL RESPONSES TO PHQ9 QUESTIONS 1 AND 2: 1
SUM OF ALL RESPONSES TO PHQ9 QUESTIONS 1 AND 2: 1
1. LITTLE INTEREST OR PLEASURE IN DOING THINGS: SEVERAL DAYS
2. FEELING DOWN, DEPRESSED OR HOPELESS: NOT AT ALL

## 2023-10-26 ASSESSMENT — ACTIVITIES OF DAILY LIVING (ADL)
GROCERY_SHOPPING: INDEPENDENT
TAKING_MEDICATION: INDEPENDENT
BATHING: INDEPENDENT
DRESSING: INDEPENDENT
MANAGING_FINANCES: INDEPENDENT
DOING_HOUSEWORK: NEEDS ASSISTANCE

## 2023-10-26 NOTE — PATIENT INSTRUCTIONS

## 2023-10-26 NOTE — PROGRESS NOTES
Subjective   Reason for Visit: Remy Abdi is an 68 y.o. male here for a Medicare Wellness visit.     Past Medical, Surgical, and Family History reviewed and updated in chart.    Reviewed all medications by prescribing practitioner or clinical pharmacist (such as prescriptions, OTCs, herbal therapies and supplements) and documented in the medical record.    HPI  Having more dyspnea with exertion lately . CT of lungs shows radiation pneumonitis. The CA is decreasing in size. Encourage RSV and Covid vaccines. Has some lymph nodes. Feeling coughing and wheezing. Also noted to have a bile duct stone.  No pain noted in RUQ.   Heart seems to be doing OK. Does have chest pain. Does have some residual narrowing of arteries. Has lightheadedness and has a number of things when he walks up steps and gets SOB.  Will cover the lungs today but if persists will need to get to cardiology.   Some edema about the same. Heart is not irregular but hard when short of breath.      Depression and anxiety with all of the above. Cymbalta did not help even at 61. Sister with kidney failure and another with dementia. Valium as needed about 30 per month.  Helps him relax with the pain. And spasms and above family issues.     HA on Nurtec helps     Hyperlipidemia on Lipitor. No recent checks. Will do Hepatic FX and lipid..     Back pain as before. On hydrocodone per Dr Calles     GERD and dysphagia. Need to get the LINX out. Has to throw up at times. Dr Coy    RA and polyarthritis - no meds. A lot of pain. Shots were not helpful. When it flares will take low dose prednisone.     Allergies still flare with seasons. Cannot tolerate steroids due to elevated blood pressure.     HTN looks good today.  Weight is stable      UDS 12/5/22  CSA 10/26/23   PSA 11/25/22 0.33.   Cologuard ordered and not done. Will look for that  DPA and LW yes wife Monica.   Pneumovax UTD Prevnar 20     Patient Care Team:  Ferdinand Carranza MD as PCP - General  Ferdinand MOLINA  MD Dillon as PCP - [employer] PCP     Review of Systems    Objective   Vitals:  /74 (BP Location: Right arm, Patient Position: Sitting)   Pulse 84   Wt 77.1 kg (170 lb)   SpO2 95%   BMI 28.29 kg/m²       Physical Exam  Vitals reviewed.   Constitutional:       General: He is not in acute distress.     Appearance: Normal appearance.   HENT:      Head: Normocephalic.      Right Ear: Tympanic membrane, ear canal and external ear normal.      Left Ear: Tympanic membrane, ear canal and external ear normal.      Nose: Nose normal.      Mouth/Throat:      Pharynx: Oropharynx is clear.      Comments: Dentures   Eyes:      Extraocular Movements: Extraocular movements intact.      Conjunctiva/sclera: Conjunctivae normal.      Pupils: Pupils are equal, round, and reactive to light.   Neck:      Vascular: No carotid bruit.   Cardiovascular:      Rate and Rhythm: Normal rate and regular rhythm.      Pulses: Normal pulses.      Heart sounds: Normal heart sounds. No murmur heard.  Pulmonary:      Effort: Pulmonary effort is normal. No respiratory distress.      Breath sounds: Normal breath sounds.   Abdominal:      General: Abdomen is flat. Bowel sounds are normal. There is no distension.      Palpations: Abdomen is soft. There is no mass.      Tenderness: There is no abdominal tenderness.   Musculoskeletal:         General: Swelling (ankles) and tenderness (paraspinal and most joints) present. Normal range of motion.      Cervical back: Normal range of motion and neck supple. No tenderness.   Lymphadenopathy:      Cervical: No cervical adenopathy.   Skin:     General: Skin is warm and dry.      Findings: No rash.   Neurological:      General: No focal deficit present.      Mental Status: He is alert and oriented to person, place, and time.   Psychiatric:         Mood and Affect: Mood normal.         Thought Content: Thought content normal.         Judgment: Judgment normal.           Assessment/Plan    Problem List Items Addressed This Visit       Atrial fibrillation (CMS/HCC)    Relevant Orders    Follow Up In Primary Care - Established    Bilateral edema of lower extremity    Cancer of lower lobe of left lung (CMS/HCC)    Common bile duct dilation    Relevant Orders    US abdomen limited liver    Hepatic Function Panel    Depression, recurrent (CMS/HCC)    Hiatal hernia with GERD    Relevant Medications    sucralfate (Carafate) 1 gram tablet    History of repair of hiatal hernia    Overview     History of Paraesophageal hiatal hernia repair;   Procedure Date: 02Jan2020   Description: has LINX IMPLANT         HTN (hypertension)    Relevant Medications    losartan (Cozaar) 50 mg tablet    Other Relevant Orders    Lipid Panel    Inflammatory polyarthritis (CMS/HCC)    Moderate asthma with acute exacerbation    Relevant Medications    azithromycin (Zithromax) 250 mg tablet    NSCLC of left lung (CMS/HCC)    Rheumatoid arthritis (CMS/HCC)     Other Visit Diagnoses       Routine general medical examination at health care facility    -  Primary

## 2023-10-27 ENCOUNTER — TELEPHONE (OUTPATIENT)
Dept: PRIMARY CARE | Facility: CLINIC | Age: 68
End: 2023-10-27
Payer: MEDICARE

## 2023-10-27 NOTE — TELEPHONE ENCOUNTER
Stating he want's the liver scan sent to [facility] instead of [facility]. Asking for a call back.

## 2023-10-30 ENCOUNTER — TELEPHONE (OUTPATIENT)
Dept: PRIMARY CARE | Facility: CLINIC | Age: 68
End: 2023-10-30
Payer: MEDICARE

## 2023-10-30 PROBLEM — J70.0 RADIATION PNEUMONITIS (MULTI): Status: ACTIVE | Noted: 2023-10-27

## 2023-10-30 RX ORDER — PREDNISONE 10 MG/1
TABLET ORAL
COMMUNITY
Start: 2023-10-27 | End: 2023-12-08

## 2023-10-30 NOTE — TELEPHONE ENCOUNTER
----- Message from Ferdinand Carranza MD sent at 10/30/2023  4:40 PM EDT -----  Regarding: RE: [facility]  Let him know the liver and lipids look good.   ----- Message -----  From: Deanne Jennings MA  Sent: 10/30/2023   4:34 PM EDT  To: Ferdinand Carranza MD  Subject: [facility]                                  PATIENT WAS SEEN 10/26/23

## 2023-11-03 ENCOUNTER — TELEPHONE (OUTPATIENT)
Dept: PRIMARY CARE | Facility: CLINIC | Age: 68
End: 2023-11-03
Payer: MEDICARE

## 2023-11-03 ENCOUNTER — PHARMACY VISIT (OUTPATIENT)
Dept: PHARMACY | Facility: CLINIC | Age: 68
End: 2023-11-03
Payer: MEDICARE

## 2023-11-03 DIAGNOSIS — I10 PRIMARY HYPERTENSION: ICD-10-CM

## 2023-11-03 DIAGNOSIS — M62.838 MUSCLE SPASM: ICD-10-CM

## 2023-11-03 PROCEDURE — RXMED WILLOW AMBULATORY MEDICATION CHARGE

## 2023-11-03 RX ORDER — LOSARTAN POTASSIUM 50 MG/1
50 TABLET ORAL DAILY
Qty: 90 TABLET | Refills: 1 | Status: SHIPPED | OUTPATIENT
Start: 2023-11-03 | End: 2024-02-29 | Stop reason: SDUPTHER

## 2023-11-03 RX ORDER — DIAZEPAM 5 MG/1
5 TABLET ORAL DAILY PRN
Qty: 30 TABLET | Refills: 0 | Status: SHIPPED | OUTPATIENT
Start: 2023-11-03 | End: 2023-12-20 | Stop reason: SDUPTHER

## 2023-11-11 ENCOUNTER — PHARMACY VISIT (OUTPATIENT)
Dept: PHARMACY | Facility: CLINIC | Age: 68
End: 2023-11-11
Payer: MEDICARE

## 2023-11-11 PROCEDURE — RXMED WILLOW AMBULATORY MEDICATION CHARGE

## 2023-11-12 ENCOUNTER — PHARMACY VISIT (OUTPATIENT)
Dept: PHARMACY | Facility: CLINIC | Age: 68
End: 2023-11-12

## 2023-11-12 PROCEDURE — RXOTC WILLOW AMBULATORY OTC CHARGE

## 2023-11-20 ENCOUNTER — PHARMACY VISIT (OUTPATIENT)
Dept: PHARMACY | Facility: CLINIC | Age: 68
End: 2023-11-20
Payer: MEDICARE

## 2023-11-20 PROCEDURE — RXMED WILLOW AMBULATORY MEDICATION CHARGE

## 2023-11-27 ENCOUNTER — TELEPHONE (OUTPATIENT)
Dept: PRIMARY CARE | Facility: CLINIC | Age: 68
End: 2023-11-27
Payer: MEDICARE

## 2023-11-27 NOTE — TELEPHONE ENCOUNTER
----- Message from Ferdinand Carranza MD sent at 11/27/2023  2:41 PM EST -----  Regarding: RE: linked visit  Let him know there are no masses in the liver. The bile duct is dilated as can be seen after removal of th gall bladder. No obstruction noted   ----- Message -----  From: Deanne Jennings MA  Sent: 11/27/2023   2:18 PM EST  To: Ferdinand Carranza MD  Subject: linked visit                                     [facility] Abdomen limited   11/25/23

## 2023-11-30 RX ORDER — METOCLOPRAMIDE 5 MG/1
5 TABLET ORAL 3 TIMES DAILY
COMMUNITY
Start: 2023-11-29 | End: 2024-04-30 | Stop reason: WASHOUT

## 2023-12-06 PROBLEM — I25.10 CAD IN NATIVE ARTERY: Status: ACTIVE | Noted: 2023-12-04

## 2023-12-06 RX ORDER — CLOPIDOGREL BISULFATE 75 MG/1
75 TABLET ORAL DAILY
COMMUNITY
Start: 2023-12-07 | End: 2023-12-20 | Stop reason: SDUPTHER

## 2023-12-07 ENCOUNTER — PATIENT OUTREACH (OUTPATIENT)
Dept: CARE COORDINATION | Facility: CLINIC | Age: 68
End: 2023-12-07
Payer: MEDICARE

## 2023-12-07 NOTE — PROGRESS NOTES
Discharge Facility: [facility]  Discharge Diagnosis: CAD, status post PTCA  Admission Date: 12/4/2023  Discharge Date: 12/6/2023    PCP Appointment Date:  -12/11/2023 1430    Specialist Appointment Date:   -12/12/2023 1100 CT  -12/14/2023 0915 Radiation  -12/20/2023 [facility] Dr. Kev Coy  -1/9/2024 0900 [facility] Dr. Andres    [facility] Encounter and Summary: Linked    See discharge assessment below for further details    Engagement  Call Start Time: 1019 (12/7/2023 10:21 AM)    Medications  Medications reviewed with patient/caregiver?: Yes (new prescription reviewed; plavix) (12/7/2023 10:21 AM)  Is the patient having any side effects they believe may be caused by any medication additions or changes?: No (12/7/2023 10:21 AM)  Does the patient have all medications ordered at discharge?: Yes (12/7/2023 10:21 AM)  Care Management Interventions: No intervention needed (12/7/2023 10:21 AM)  Is the patient taking all medications as directed (includes completed medication regime)?: Yes (12/7/2023 10:21 AM)    Appointments  Does the patient have a primary care provider?: Yes (12/7/2023 10:21 AM)  Care Management Interventions: Verified appointment date/time/provider (12/7/2023 10:21 AM)  Has the patient kept scheduled appointments due by today?: Yes (12/7/2023 10:21 AM)    Self Management  Has home health visited the patient within 72 hours of discharge?: Not applicable (12/7/2023 10:21 AM)    Patient Teaching  Does the patient have access to their discharge instructions?: Yes (12/7/2023 10:21 AM)  Care Management Interventions: Reviewed instructions with patient (12/7/2023 10:21 AM)  What is the patient's perception of their health status since discharge?: Improving (12/7/2023 10:21 AM)  Is the patient/caregiver able to teach back the hierarchy of who to call/visit for symptoms/problems? PCP, Specialist, Home Health nurse, Urgent Care, ED, 911: Yes (12/7/2023  10:21 AM)    Wrap Up  Wrap Up Additional Comments: Pt was admitted to [facility] 12/4-12/6/2023. Per inpt notes: for planned cardiac catheterization. He was found to have two-vessel severe coronary artery disease. Patent LIMA to LAD with no significant disease. High-grade stenosis of the left main. Unfortunately, despite high-dose sedation and analgesia patient was unable to stay still to proceed with complex percutaneous coronary intervention. He was brought back to the cardiac catheterization lab on 12/5/2023 for planned complex percutaneous coronary intervention with anesthesia/MAC. He is status post successful complex percutaneous coronary intervention of the left main and left circumflex with rotational atherectomy and drug-eluting stent x 1. He will continue on triple therapy with aspirin, Plavix, and Eliquis for 2 weeks. After 2 weeks he will stop aspirin and continue on Plavix and Eliquis (for his paroxysmal atrial fibrillation). Spoke with pt wife Monica. She states pt is doing well just resting. Monica reports understanding of discharge instructions and denies any questions at this time. Pt started plavix with out issues. Verified PCP follow up 12/11/2023 1430. Monica encouraged to call if questions or needs arise. (12/7/2023 10:21 AM)  Call End Time: 1023 (12/7/2023 10:21 AM)

## 2023-12-11 ENCOUNTER — APPOINTMENT (OUTPATIENT)
Dept: PRIMARY CARE | Facility: CLINIC | Age: 68
End: 2023-12-11
Payer: MEDICARE

## 2023-12-18 ENCOUNTER — PHARMACY VISIT (OUTPATIENT)
Dept: PHARMACY | Facility: CLINIC | Age: 68
End: 2023-12-18
Payer: MEDICARE

## 2023-12-18 PROCEDURE — RXMED WILLOW AMBULATORY MEDICATION CHARGE

## 2023-12-18 RX ORDER — MORPHINE SULFATE 15 MG/1
TABLET, FILM COATED, EXTENDED RELEASE ORAL
Qty: 56 TABLET | Refills: 0 | OUTPATIENT
Start: 2023-12-18 | End: 2024-01-30 | Stop reason: SDUPTHER

## 2023-12-18 RX ORDER — MORPHINE SULFATE 15 MG/1
TABLET, FILM COATED, EXTENDED RELEASE ORAL
Qty: 56 TABLET | Refills: 0 | OUTPATIENT
Start: 2024-01-15

## 2023-12-18 RX ORDER — HYDROCODONE BITARTRATE AND ACETAMINOPHEN 7.5; 325 MG/1; MG/1
TABLET ORAL
Qty: 84 TABLET | Refills: 0 | OUTPATIENT
Start: 2023-12-18

## 2023-12-18 RX ORDER — HYDROCODONE BITARTRATE AND ACETAMINOPHEN 7.5; 325 MG/1; MG/1
TABLET ORAL
Qty: 84 TABLET | Refills: 0 | OUTPATIENT
Start: 2024-01-15

## 2023-12-20 ENCOUNTER — OFFICE VISIT (OUTPATIENT)
Dept: PRIMARY CARE | Facility: CLINIC | Age: 68
End: 2023-12-20
Payer: MEDICARE

## 2023-12-20 ENCOUNTER — PHARMACY VISIT (OUTPATIENT)
Dept: PHARMACY | Facility: CLINIC | Age: 68
End: 2023-12-20
Payer: MEDICARE

## 2023-12-20 VITALS
DIASTOLIC BLOOD PRESSURE: 64 MMHG | SYSTOLIC BLOOD PRESSURE: 120 MMHG | TEMPERATURE: 98 F | BODY MASS INDEX: 28.17 KG/M2 | OXYGEN SATURATION: 96 % | HEART RATE: 89 BPM | WEIGHT: 169.3 LBS

## 2023-12-20 DIAGNOSIS — J70.0 RADIATION PNEUMONITIS (MULTI): ICD-10-CM

## 2023-12-20 DIAGNOSIS — I25.10 CAD IN NATIVE ARTERY: ICD-10-CM

## 2023-12-20 DIAGNOSIS — C34.32 CANCER OF LOWER LOBE OF LEFT LUNG (MULTI): ICD-10-CM

## 2023-12-20 DIAGNOSIS — C34.92 NSCLC OF LEFT LUNG (MULTI): ICD-10-CM

## 2023-12-20 DIAGNOSIS — J45.41 MODERATE PERSISTENT ASTHMA WITH ACUTE EXACERBATION (HHS-HCC): ICD-10-CM

## 2023-12-20 DIAGNOSIS — F41.1 GENERALIZED ANXIETY DISORDER: ICD-10-CM

## 2023-12-20 DIAGNOSIS — M06.4 INFLAMMATORY POLYARTHRITIS (MULTI): ICD-10-CM

## 2023-12-20 DIAGNOSIS — M54.9 BACK PAIN, UNSPECIFIED BACK LOCATION, UNSPECIFIED BACK PAIN LATERALITY, UNSPECIFIED CHRONICITY: ICD-10-CM

## 2023-12-20 DIAGNOSIS — M62.838 MUSCLE SPASM: ICD-10-CM

## 2023-12-20 DIAGNOSIS — J32.9 RECURRENT SINUSITIS: ICD-10-CM

## 2023-12-20 DIAGNOSIS — Z09 HOSPITAL DISCHARGE FOLLOW-UP: Primary | ICD-10-CM

## 2023-12-20 PROCEDURE — 3008F BODY MASS INDEX DOCD: CPT | Performed by: FAMILY MEDICINE

## 2023-12-20 PROCEDURE — 1036F TOBACCO NON-USER: CPT | Performed by: FAMILY MEDICINE

## 2023-12-20 PROCEDURE — RXMED WILLOW AMBULATORY MEDICATION CHARGE

## 2023-12-20 PROCEDURE — 3078F DIAST BP <80 MM HG: CPT | Performed by: FAMILY MEDICINE

## 2023-12-20 PROCEDURE — 1159F MED LIST DOCD IN RCRD: CPT | Performed by: FAMILY MEDICINE

## 2023-12-20 PROCEDURE — 3074F SYST BP LT 130 MM HG: CPT | Performed by: FAMILY MEDICINE

## 2023-12-20 PROCEDURE — 99495 TRANSJ CARE MGMT MOD F2F 14D: CPT | Performed by: FAMILY MEDICINE

## 2023-12-20 PROCEDURE — 1160F RVW MEDS BY RX/DR IN RCRD: CPT | Performed by: FAMILY MEDICINE

## 2023-12-20 RX ORDER — AMOXICILLIN AND CLAVULANATE POTASSIUM 875; 125 MG/1; MG/1
875 TABLET, FILM COATED ORAL 2 TIMES DAILY
Qty: 20 TABLET | Refills: 1 | Status: SHIPPED | OUTPATIENT
Start: 2023-12-20 | End: 2024-04-08

## 2023-12-20 RX ORDER — MONTELUKAST SODIUM 10 MG/1
10 TABLET ORAL NIGHTLY
Qty: 90 TABLET | Refills: 3 | Status: SHIPPED | OUTPATIENT
Start: 2023-12-20 | End: 2024-01-30 | Stop reason: SDUPTHER

## 2023-12-20 RX ORDER — DIAZEPAM 5 MG/1
5 TABLET ORAL DAILY PRN
Qty: 30 TABLET | Refills: 0 | Status: SHIPPED | OUTPATIENT
Start: 2023-12-20 | End: 2024-01-30 | Stop reason: SDUPTHER

## 2023-12-20 RX ORDER — PREDNISONE 10 MG/1
10 TABLET ORAL DAILY
Qty: 90 TABLET | Refills: 3 | Status: SHIPPED | OUTPATIENT
Start: 2023-12-20 | End: 2024-01-30 | Stop reason: DRUGHIGH

## 2023-12-20 RX ORDER — CLOPIDOGREL BISULFATE 75 MG/1
75 TABLET ORAL DAILY
Qty: 90 TABLET | Refills: 3 | Status: SHIPPED | OUTPATIENT
Start: 2023-12-20 | End: 2024-01-30 | Stop reason: SDUPTHER

## 2023-12-20 RX ORDER — GABAPENTIN 600 MG/1
1200 TABLET ORAL NIGHTLY
Qty: 180 TABLET | Refills: 1 | Status: SHIPPED | OUTPATIENT
Start: 2023-12-20

## 2023-12-20 RX ORDER — AMOXICILLIN AND CLAVULANATE POTASSIUM 875; 125 MG/1; MG/1
875 TABLET, FILM COATED ORAL 2 TIMES DAILY
Qty: 20 TABLET | Refills: 1 | Status: SHIPPED | OUTPATIENT
Start: 2023-12-20 | End: 2023-12-20 | Stop reason: SDUPTHER

## 2023-12-20 NOTE — PROGRESS NOTES
Subjective   Patient ID: Remy Abdi is a 68 y.o. male who presents for Hospital Follow-up (12/4 [facility] Dr Valerio/DX: CAD, Atherosclerotic heart disease/Med medication started:  clopidogrel 75 mg daily/12/5 heart cath ).    HPI Admit to [facility] 12/4-6/23.  Went to Dr Andres. Did not feel right and did a cath with significant stenosis. Put in stent Tried in groin but needed to go in arm as he could not lay still.  Was having pain like a pulling on the chest. Short of breath. Pain to arm. Rest did help. Has inflammation in the lung and has been on prednisone 10 mg daily for radiation pneumonitis and polyarthritis . She is on Plavix. Was on ASA for a week. Also on Eliquis.  Some mild bleeding from nose. A lot of PD and recurrent sinusitis.   The shortness of breath is not a lot better. But the pain is gone. Unless he overexerts.  Mild edema. No longer on diuretics.  To start cardiac rehab in [place] for 12 weeks.   Continues to have problems with the heartburn. The Linx is still in. Put on Reglan. That does seem to help. Cannot take qid, only bid or gets side effects.. Seldom takes the Carafate.   Still on Valium 1/2 to 1 at hs to help relax and muscle spasms. OARRS consistent.    BMP WNL on 12/6  CBC 11.9 on 12/5 also.   UDS 12/5/23 with routine visit next month  CSA 8/26/22   I have personally reviewed the patients OARRS report. I have considered the risks of abuse, addiction and diversion. I believe it is clinically appropriate to continue to prescribe this medication.     Review of Systems    Objective   /64 (BP Location: Right arm)   Pulse 89   Temp 36.7 °C (98 °F)   Wt 76.8 kg (169 lb 4.8 oz)   SpO2 96%   BMI 28.17 kg/m²     Physical Exam  Vitals reviewed.   Constitutional:       General: He is not in acute distress.     Appearance: Normal appearance.   HENT:      Head: Normocephalic.      Right Ear: Tympanic membrane, ear canal and external ear normal.      Left Ear: Tympanic  membrane, ear canal and external ear normal.      Nose: Nose normal.      Mouth/Throat:      Pharynx: Oropharynx is clear.   Eyes:      Extraocular Movements: Extraocular movements intact.      Conjunctiva/sclera: Conjunctivae normal.      Pupils: Pupils are equal, round, and reactive to light.   Neck:      Vascular: No carotid bruit.   Cardiovascular:      Rate and Rhythm: Normal rate and regular rhythm.      Pulses: Normal pulses.      Heart sounds: Normal heart sounds. No murmur heard.  Pulmonary:      Effort: Pulmonary effort is normal. No respiratory distress.      Breath sounds: Normal breath sounds.   Abdominal:      General: Abdomen is flat. Bowel sounds are normal. There is no distension.      Palpations: Abdomen is soft. There is no mass.      Tenderness: There is no abdominal tenderness.   Musculoskeletal:      Cervical back: Normal range of motion and neck supple. No tenderness.   Lymphadenopathy:      Cervical: No cervical adenopathy.   Skin:     General: Skin is warm and dry.      Findings: No rash.   Neurological:      General: No focal deficit present.      Mental Status: He is alert and oriented to person, place, and time.   Psychiatric:         Mood and Affect: Mood normal.         Thought Content: Thought content normal.         Judgment: Judgment normal.         Assessment/Plan   Diagnoses and all orders for this visit:  Hospital discharge follow-up  CAD in native artery  -     clopidogrel (Plavix) 75 mg tablet; Take 1 tablet (75 mg) by mouth once daily.  Muscle spasm  -     diazePAM (Valium) 5 mg tablet; Take 1 tablet (5 mg) by mouth once daily as needed for anxiety.  -     montelukast (Singulair) 10 mg tablet; Take 1 tablet (10 mg) by mouth once daily at bedtime.  Back pain, unspecified back location, unspecified back pain laterality, unspecified chronicity  -     gabapentin (Neurontin) 600 mg tablet; Take 2 tablets (1,200 mg) by mouth once daily at bedtime.  Recurrent sinusitis  -      amoxicillin-pot clavulanate (Augmentin) 875-125 mg tablet; Take 1 tablet (875 mg) by mouth 2 times a day for 10 days.  Inflammatory polyarthritis (CMS/HCC)  -     predniSONE (Deltasone) 10 mg tablet; Take 1 tablet (10 mg) by mouth once daily.  Radiation pneumonitis (CMS/HCC)  NSCLC of left lung (CMS/HCC)  Moderate persistent asthma with acute exacerbation  Generalized anxiety disorder  Cancer of lower lobe of left lung (CMS/HCC)

## 2023-12-21 ENCOUNTER — PATIENT OUTREACH (OUTPATIENT)
Dept: CARE COORDINATION | Facility: CLINIC | Age: 68
End: 2023-12-21
Payer: MEDICARE

## 2024-01-11 PROCEDURE — RXMED WILLOW AMBULATORY MEDICATION CHARGE

## 2024-01-15 ENCOUNTER — PHARMACY VISIT (OUTPATIENT)
Dept: PHARMACY | Facility: CLINIC | Age: 69
End: 2024-01-15
Payer: MEDICARE

## 2024-01-15 PROCEDURE — RXMED WILLOW AMBULATORY MEDICATION CHARGE

## 2024-01-19 ENCOUNTER — PATIENT OUTREACH (OUTPATIENT)
Dept: CARE COORDINATION | Facility: CLINIC | Age: 69
End: 2024-01-19
Payer: MEDICARE

## 2024-01-19 NOTE — PROGRESS NOTES
Call placed regarding one month post discharge follow up call. Spoke with pt wife Monica.  At time of outreach call Monica feels as if pt condition has remained the same since initial visit with PCP or specialist. She reports pt is still having shortness of breath on exertion and congestion.  Questions or concerns regarding recovery period addressed at this time.  Reviewed any PCP or specialists progress notes/labs/radiology reports if applicable and addressed any questions or concerns.  Pt has had cardiac rehab and follow up appt with cardiologist 1/9/2024. Monica denies any questions regarding appts. She reports pt has all of his medication and denies any needs from PCP at this time.  Verified next PCP appt 1/30/2024 1000.  Monica denies any questions or needs. She is encouraged to call if questions or needs arise.

## 2024-01-30 ENCOUNTER — OFFICE VISIT (OUTPATIENT)
Dept: PRIMARY CARE | Facility: CLINIC | Age: 69
End: 2024-01-30
Payer: MEDICARE

## 2024-01-30 VITALS
WEIGHT: 170 LBS | OXYGEN SATURATION: 96 % | HEIGHT: 65 IN | SYSTOLIC BLOOD PRESSURE: 106 MMHG | BODY MASS INDEX: 28.32 KG/M2 | HEART RATE: 80 BPM | DIASTOLIC BLOOD PRESSURE: 62 MMHG

## 2024-01-30 DIAGNOSIS — E83.51 HYPOCALCEMIA: ICD-10-CM

## 2024-01-30 DIAGNOSIS — I25.10 CAD IN NATIVE ARTERY: ICD-10-CM

## 2024-01-30 DIAGNOSIS — F33.9 DEPRESSION, RECURRENT (CMS-HCC): ICD-10-CM

## 2024-01-30 DIAGNOSIS — Z79.899 MEDICATION MANAGEMENT: Primary | ICD-10-CM

## 2024-01-30 DIAGNOSIS — M06.4 INFLAMMATORY POLYARTHRITIS (MULTI): ICD-10-CM

## 2024-01-30 DIAGNOSIS — F41.1 GENERALIZED ANXIETY DISORDER: ICD-10-CM

## 2024-01-30 DIAGNOSIS — M62.838 MUSCLE SPASM: ICD-10-CM

## 2024-01-30 DIAGNOSIS — R13.19 ESOPHAGEAL DYSPHAGIA: ICD-10-CM

## 2024-01-30 DIAGNOSIS — E78.00 HYPERCHOLESTEROLEMIA: ICD-10-CM

## 2024-01-30 DIAGNOSIS — J70.0 RADIATION PNEUMONITIS (MULTI): ICD-10-CM

## 2024-01-30 DIAGNOSIS — K21.9 HIATAL HERNIA WITH GERD: ICD-10-CM

## 2024-01-30 DIAGNOSIS — J32.9 RECURRENT SINUSITIS: ICD-10-CM

## 2024-01-30 DIAGNOSIS — D63.0 ANEMIA IN NEOPLASTIC DISEASE: ICD-10-CM

## 2024-01-30 DIAGNOSIS — I48.0 PAROXYSMAL ATRIAL FIBRILLATION (MULTI): ICD-10-CM

## 2024-01-30 DIAGNOSIS — C34.92 NSCLC OF LEFT LUNG (MULTI): ICD-10-CM

## 2024-01-30 DIAGNOSIS — M06.9 RHEUMATOID ARTHRITIS INVOLVING MULTIPLE SITES, UNSPECIFIED WHETHER RHEUMATOID FACTOR PRESENT (MULTI): ICD-10-CM

## 2024-01-30 DIAGNOSIS — G43.709 CHRONIC MIGRAINE WITHOUT AURA WITHOUT STATUS MIGRAINOSUS, NOT INTRACTABLE: ICD-10-CM

## 2024-01-30 DIAGNOSIS — K44.9 HIATAL HERNIA WITH GERD: ICD-10-CM

## 2024-01-30 DIAGNOSIS — C34.32 CANCER OF LOWER LOBE OF LEFT LUNG (MULTI): ICD-10-CM

## 2024-01-30 PROBLEM — R60.0 BILATERAL EDEMA OF LOWER EXTREMITY: Status: RESOLVED | Noted: 2023-01-18 | Resolved: 2024-01-30

## 2024-01-30 PROCEDURE — 80365 DRUG SCREENING OXYCODONE: CPT

## 2024-01-30 PROCEDURE — 80346 BENZODIAZEPINES1-12: CPT

## 2024-01-30 PROCEDURE — 3008F BODY MASS INDEX DOCD: CPT | Performed by: FAMILY MEDICINE

## 2024-01-30 PROCEDURE — 80368 SEDATIVE HYPNOTICS: CPT

## 2024-01-30 PROCEDURE — 1160F RVW MEDS BY RX/DR IN RCRD: CPT | Performed by: FAMILY MEDICINE

## 2024-01-30 PROCEDURE — 80354 DRUG SCREENING FENTANYL: CPT

## 2024-01-30 PROCEDURE — 80358 DRUG SCREENING METHADONE: CPT

## 2024-01-30 PROCEDURE — 3074F SYST BP LT 130 MM HG: CPT | Performed by: FAMILY MEDICINE

## 2024-01-30 PROCEDURE — 3078F DIAST BP <80 MM HG: CPT | Performed by: FAMILY MEDICINE

## 2024-01-30 PROCEDURE — RXMED WILLOW AMBULATORY MEDICATION CHARGE

## 2024-01-30 PROCEDURE — 80307 DRUG TEST PRSMV CHEM ANLYZR: CPT

## 2024-01-30 PROCEDURE — 82570 ASSAY OF URINE CREATININE: CPT

## 2024-01-30 PROCEDURE — 80373 DRUG SCREENING TRAMADOL: CPT

## 2024-01-30 PROCEDURE — 99214 OFFICE O/P EST MOD 30 MIN: CPT | Performed by: FAMILY MEDICINE

## 2024-01-30 PROCEDURE — 80361 OPIATES 1 OR MORE: CPT

## 2024-01-30 PROCEDURE — 1159F MED LIST DOCD IN RCRD: CPT | Performed by: FAMILY MEDICINE

## 2024-01-30 PROCEDURE — 1036F TOBACCO NON-USER: CPT | Performed by: FAMILY MEDICINE

## 2024-01-30 RX ORDER — DIAZEPAM 5 MG/1
5 TABLET ORAL DAILY PRN
Qty: 30 TABLET | Refills: 0 | Status: SHIPPED | OUTPATIENT
Start: 2024-01-30 | End: 2024-04-04 | Stop reason: SDUPTHER

## 2024-01-30 RX ORDER — CLOPIDOGREL BISULFATE 75 MG/1
75 TABLET ORAL DAILY
Qty: 90 TABLET | Refills: 3 | Status: SHIPPED | OUTPATIENT
Start: 2024-01-30 | End: 2025-01-24

## 2024-01-30 RX ORDER — PANTOPRAZOLE SODIUM 40 MG/1
40 TABLET, DELAYED RELEASE ORAL DAILY
Qty: 90 TABLET | Refills: 3 | Status: SHIPPED | OUTPATIENT
Start: 2024-01-30 | End: 2025-01-29

## 2024-01-30 RX ORDER — MONTELUKAST SODIUM 10 MG/1
10 TABLET ORAL NIGHTLY
Qty: 90 TABLET | Refills: 3 | Status: SHIPPED | OUTPATIENT
Start: 2024-01-30

## 2024-01-30 RX ORDER — ATORVASTATIN CALCIUM 40 MG/1
40 TABLET, FILM COATED ORAL NIGHTLY
Qty: 90 TABLET | Refills: 3 | Status: SHIPPED | OUTPATIENT
Start: 2024-01-30 | End: 2025-01-29

## 2024-01-30 RX ORDER — PREDNISONE 10 MG/1
10 TABLET ORAL DAILY PRN
Qty: 90 TABLET | Refills: 3 | Status: SHIPPED | OUTPATIENT
Start: 2024-01-30 | End: 2025-01-29

## 2024-01-30 NOTE — PROGRESS NOTES
Subjective   Patient ID: Remy Abdi is a 68 y.o. male who presents for Med Management.    [facility]   12/20/23  Admit to [facility] 12/4-6/23.  Went to Dr Andres. Did not feel right and did a cath with significant stenosis. Put in stent Tried in groin but needed to go in arm as he could not lay still.  Was having pain like a pulling on the chest. Short of breath. Pain to arm. Rest did help. Has inflammation in the lung and has been on prednisone 10 mg daily for radiation pneumonitis and polyarthritis . She is on Plavix. Was on ASA for a week. Also on Eliquis.  Some mild bleeding from nose. A lot of PD and recurrent sinusitis.   The shortness of breath is not a lot better. But the pain is gone. Unless he overexerts.  Mild edema. No longer on diuretics.  To start cardiac rehab in [place] for 12 weeks.   Continues to have problems with the heartburn. The Linx is still in. Put on Reglan. That does seem to help. Cannot take qid, only bid or gets side effects.. Seldom takes the Carafate.   Still on Valium 1/2 to 1 at hs to help relax and muscle spasms. OARRS consistent.    BMP WNL on 12/6  CBC 11.9 on 12/5 also.     1/30/24  Having issues with getting medications from [facility].  Has been coughing more lately with chest congestion. Feels inflamed from radiation. From radiation. Coughing up mucus. Ginger Ale will help.  Prednisone as needed for arthritis can be tried for this.  Has a refill on Augmentin if he notes more mucus.  Got diarrhea from that.  Shortness of breath still but no particularly worse. He is driving a handicap van. Wife if limited as she has CTS surgery. He will get RSV vaccine. The heartburn is helped with pantoprazole and watching diet. Still has Linx in. Reglan does help for now. Cannot take qid or gets diarrhea.  Cannot tolerate the LA nitroglycerin. Lung cancer is in remission to follow up in March for scan.  Rheumatoid Arthritis only on prednisone when acting up. None recently.    Depression is still  "a struggle with all the health issues and family health.   Atrial Fibrillation not being felt since he had the stent.    Weight is stable  Anxiety still on Valium at hs only.. Has Narcan since on this and the  opiates.    BMP with low calcium in December  and mild anemia also.  UDS 1/30/24 as expected for medication profile   CSA 1/30/24  I have personally reviewed the patients OARRS report. I have considered the risks of abuse, addiction and diversion. I believe it is clinically appropriate to continue to prescribe this medication.  Still opiates per Dr Cobb. Gabapentin  Review of Systems    Objective   /62 (BP Location: Left arm, Patient Position: Sitting)   Pulse 80   Ht 1.657 m (5' 5.25\")   Wt 77.1 kg (170 lb)   SpO2 96%   BMI 28.07 kg/m²     Physical Exam  Vitals reviewed.   Constitutional:       General: He is not in acute distress.     Appearance: Normal appearance. He is normal weight.   HENT:      Head: Normocephalic.      Right Ear: Tympanic membrane, ear canal and external ear normal.      Left Ear: Tympanic membrane, ear canal and external ear normal.      Nose: Nose normal.      Mouth/Throat:      Pharynx: Oropharynx is clear.      Comments: Hoarse  Dentures    Eyes:      Extraocular Movements: Extraocular movements intact.      Conjunctiva/sclera: Conjunctivae normal.      Pupils: Pupils are equal, round, and reactive to light.   Neck:      Vascular: No carotid bruit.   Cardiovascular:      Rate and Rhythm: Normal rate and regular rhythm.      Pulses: Normal pulses.      Heart sounds: Normal heart sounds. No murmur heard.  Pulmonary:      Effort: Pulmonary effort is normal. No respiratory distress.      Breath sounds: Normal breath sounds.   Abdominal:      General: Abdomen is flat. Bowel sounds are normal. There is no distension.      Palpations: Abdomen is soft. There is no mass.      Tenderness: There is no abdominal tenderness.   Musculoskeletal:      Cervical back: Normal range of " motion and neck supple. No tenderness.      Right lower le+ Pitting Edema present.      Left lower le+ Pitting Edema present.   Lymphadenopathy:      Cervical: No cervical adenopathy.   Skin:     General: Skin is warm and dry.      Findings: No rash.   Neurological:      General: No focal deficit present.      Mental Status: He is alert and oriented to person, place, and time.   Psychiatric:         Mood and Affect: Mood normal.         Thought Content: Thought content normal.         Judgment: Judgment normal.         Assessment/Plan   Diagnoses and all orders for this visit:  Medication management  -     Opiate/Opioid/Benzo Prescription Compliance  -     OOB Internal Tracking  Paroxysmal atrial fibrillation (CMS/HCC)  -     Follow Up In Primary Care - Established  Muscle spasm  -     montelukast (Singulair) 10 mg tablet; Take 1 tablet (10 mg) by mouth once daily at bedtime.  -     diazePAM (Valium) 5 mg tablet; Take 1 tablet (5 mg) by mouth once daily as needed for anxiety.  Hypercholesterolemia  -     atorvastatin (Lipitor) 40 mg tablet; Take 1 tablet (40 mg) by mouth once daily at bedtime.  Hiatal hernia with GERD  -     pantoprazole (ProtoNix) 40 mg EC tablet; Take 1 tablet (40 mg) by mouth once daily. Do not crush, chew, or split.  CAD in native artery  -     clopidogrel (Plavix) 75 mg tablet; Take 1 tablet (75 mg) by mouth once daily.  Chronic migraine without aura without status migrainosus, not intractable  -     rimegepant (NURTEC) 75 mg tablet,disintegrating; Take 1 tablet (75 mg) by mouth once daily as needed (Take 1 tablet daily as needed for migraine.).  Inflammatory polyarthritis (CMS/HCC)  -     predniSONE (Deltasone) 10 mg tablet; Take 1 tablet (10 mg) by mouth once daily as needed (arthritis pain or chest congestion).  -     Comprehensive metabolic panel; Future  Cancer of lower lobe of left lung (CMS/HCC)  Rheumatoid arthritis involving multiple sites, unspecified whether rheumatoid factor  present (CMS/HCC)  Depression, recurrent (CMS/HCC)  Radiation pneumonitis (CMS/HCC)  Esophageal dysphagia  NSCLC of left lung (CMS/HCC)  Recurrent sinusitis  Generalized anxiety disorder  Anemia in neoplastic disease  -     CBC and Auto Differential; Future  Hypocalcemia  -     Calcium, ionized; Future  Other orders  -     Follow Up In Primary Care - Established; Future

## 2024-01-31 LAB
AMPHETAMINES UR QL SCN: NORMAL
BARBITURATES UR QL SCN: NORMAL
BZE UR QL SCN: NORMAL
CANNABINOIDS UR QL SCN: NORMAL
CREAT UR-MCNC: 155.5 MG/DL (ref 20–370)
PCP UR QL SCN: NORMAL

## 2024-02-01 ENCOUNTER — TELEPHONE (OUTPATIENT)
Dept: PRIMARY CARE | Facility: CLINIC | Age: 69
End: 2024-02-01
Payer: MEDICARE

## 2024-02-01 DIAGNOSIS — D64.9 ANEMIA, UNSPECIFIED TYPE: Primary | ICD-10-CM

## 2024-02-01 NOTE — TELEPHONE ENCOUNTER
I spoke with Remy about labs which were good except for hemoglobin of 7.9. Some blood in stools a week or so ago and some mild bloody noses.  But this is a drop of 4.0.  He is lightheaded. Will check the CBC and Ferritin and iron in 4 days.

## 2024-02-02 DIAGNOSIS — D63.0 ANEMIA IN NEOPLASTIC DISEASE: Primary | ICD-10-CM

## 2024-02-02 LAB
1OH-MIDAZOLAM UR CFM-MCNC: <25 NG/ML
6MAM UR CFM-MCNC: <25 NG/ML
7AMINOCLONAZEPAM UR CFM-MCNC: <25 NG/ML
A-OH ALPRAZ UR CFM-MCNC: <25 NG/ML
ALPRAZ UR CFM-MCNC: <25 NG/ML
CHLORDIAZEP UR CFM-MCNC: <25 NG/ML
CLONAZEPAM UR CFM-MCNC: <25 NG/ML
CODEINE UR CFM-MCNC: <50 NG/ML
DIAZEPAM UR CFM-MCNC: <25 NG/ML
EDDP UR CFM-MCNC: <25 NG/ML
FENTANYL UR CFM-MCNC: <2.5 NG/ML
HYDROCODONE CTO UR CFM-MCNC: >2500 NG/ML
HYDROMORPHONE UR CFM-MCNC: 721 NG/ML
LORAZEPAM UR CFM-MCNC: <25 NG/ML
METHADONE UR CFM-MCNC: <25 NG/ML
MIDAZOLAM UR CFM-MCNC: <25 NG/ML
MORPHINE UR CFM-MCNC: >2500 NG/ML
NORDIAZEPAM UR CFM-MCNC: 337 NG/ML
NORFENTANYL UR CFM-MCNC: <2.5 NG/ML
NORHYDROCODONE UR CFM-MCNC: >1000 NG/ML
NOROXYCODONE UR CFM-MCNC: <25 NG/ML
NORTRAMADOL UR-MCNC: <50 NG/ML
OXAZEPAM UR CFM-MCNC: 796 NG/ML
OXYCODONE UR CFM-MCNC: <25 NG/ML
OXYMORPHONE UR CFM-MCNC: <25 NG/ML
TEMAZEPAM UR CFM-MCNC: 725 NG/ML
TRAMADOL UR CFM-MCNC: <50 NG/ML
ZOLPIDEM UR CFM-MCNC: <25 NG/ML
ZOLPIDEM UR-MCNC: <25 NG/ML

## 2024-02-05 ENCOUNTER — PHARMACY VISIT (OUTPATIENT)
Dept: PHARMACY | Facility: CLINIC | Age: 69
End: 2024-02-05
Payer: MEDICARE

## 2024-02-05 ENCOUNTER — TELEPHONE (OUTPATIENT)
Dept: PRIMARY CARE | Facility: CLINIC | Age: 69
End: 2024-02-05
Payer: MEDICARE

## 2024-02-05 NOTE — TELEPHONE ENCOUNTER
Fax number for [facility] is [phone] to send the orders for blood work. Asking for a call back when it's sent, so he go get it done.    Pt wants 90 day supply.

## 2024-02-05 NOTE — TELEPHONE ENCOUNTER
Lmvm for patient asking what lab orders he wants sent to [facility]...if it's the orders in for CBC, iron and ferritin or if he's wanting something else ordered.

## 2024-02-05 NOTE — TELEPHONE ENCOUNTER
Pc to patient and let him know lab orders have been faxed to [facility]

## 2024-02-13 ENCOUNTER — PHARMACY VISIT (OUTPATIENT)
Dept: PHARMACY | Facility: CLINIC | Age: 69
End: 2024-02-13
Payer: MEDICARE

## 2024-02-13 PROCEDURE — RXMED WILLOW AMBULATORY MEDICATION CHARGE

## 2024-02-13 RX ORDER — MORPHINE SULFATE 15 MG/1
TABLET, FILM COATED, EXTENDED RELEASE ORAL
Qty: 56 TABLET | Refills: 0 | OUTPATIENT
Start: 2024-03-12

## 2024-02-13 RX ORDER — MORPHINE SULFATE 15 MG/1
TABLET, FILM COATED, EXTENDED RELEASE ORAL
Qty: 56 TABLET | Refills: 0 | OUTPATIENT
Start: 2024-02-13

## 2024-02-13 RX ORDER — HYDROCODONE BITARTRATE AND ACETAMINOPHEN 7.5; 325 MG/1; MG/1
TABLET ORAL
Qty: 84 TABLET | Refills: 0 | OUTPATIENT
Start: 2024-02-13

## 2024-02-13 RX ORDER — HYDROCODONE BITARTRATE AND ACETAMINOPHEN 7.5; 325 MG/1; MG/1
TABLET ORAL
Qty: 84 TABLET | Refills: 0 | OUTPATIENT
Start: 2024-03-12 | End: 2024-04-09 | Stop reason: SDUPTHER

## 2024-02-22 ENCOUNTER — TELEPHONE (OUTPATIENT)
Dept: PRIMARY CARE | Facility: CLINIC | Age: 69
End: 2024-02-22
Payer: MEDICARE

## 2024-02-22 DIAGNOSIS — D63.0 ANEMIA IN NEOPLASTIC DISEASE: Primary | ICD-10-CM

## 2024-02-22 NOTE — TELEPHONE ENCOUNTER
----- Message from Ferdinand Carranza MD sent at 2/21/2024  2:06 PM EST -----  Regarding: RE: linked visit  Let him know hemoglobin slowly improving   ----- Message -----  From: Deanne Jennings MA  Sent: 2/21/2024  12:15 PM EST  To: Ferdinand Carranza MD  Subject: linked visit                                     [facility]   CBC   2/21/24    patient seen 1/30/24   next appt. 4/30/24

## 2024-02-28 ENCOUNTER — PATIENT OUTREACH (OUTPATIENT)
Dept: CARE COORDINATION | Facility: CLINIC | Age: 69
End: 2024-02-28
Payer: MEDICARE

## 2024-02-29 ENCOUNTER — TELEPHONE (OUTPATIENT)
Dept: PRIMARY CARE | Facility: CLINIC | Age: 69
End: 2024-02-29
Payer: MEDICARE

## 2024-02-29 ENCOUNTER — PHARMACY VISIT (OUTPATIENT)
Dept: PHARMACY | Facility: CLINIC | Age: 69
End: 2024-02-29
Payer: MEDICARE

## 2024-02-29 DIAGNOSIS — I10 PRIMARY HYPERTENSION: ICD-10-CM

## 2024-02-29 PROCEDURE — RXMED WILLOW AMBULATORY MEDICATION CHARGE

## 2024-02-29 RX ORDER — LOSARTAN POTASSIUM 50 MG/1
50 TABLET ORAL DAILY
Qty: 90 TABLET | Refills: 1 | Status: SHIPPED | OUTPATIENT
Start: 2024-02-29 | End: 2024-04-30 | Stop reason: SDUPTHER

## 2024-03-12 PROCEDURE — RXMED WILLOW AMBULATORY MEDICATION CHARGE

## 2024-03-13 ENCOUNTER — PHARMACY VISIT (OUTPATIENT)
Dept: PHARMACY | Facility: CLINIC | Age: 69
End: 2024-03-13
Payer: MEDICARE

## 2024-03-22 ENCOUNTER — TELEPHONE (OUTPATIENT)
Dept: PRIMARY CARE | Facility: CLINIC | Age: 69
End: 2024-03-22
Payer: MEDICARE

## 2024-03-22 NOTE — TELEPHONE ENCOUNTER
----- Message from Ferdinand Carranza MD sent at 3/22/2024  3:38 PM EDT -----  Regarding: RE: linked visit  Let him know the labs look pretty good   ----- Message -----  From: Deanne Jennings MA  Sent: 3/22/2024   2:17 PM EDT  To: Ferdinand Carranza MD  Subject: linked visit                                     [facility]   CBC, CMP   3/22/24

## 2024-03-22 NOTE — TELEPHONE ENCOUNTER
Lm with patients wife and let her know patients labs look pretty good.  She will let patient know.

## 2024-03-28 PROCEDURE — RXMED WILLOW AMBULATORY MEDICATION CHARGE

## 2024-03-29 ENCOUNTER — PHARMACY VISIT (OUTPATIENT)
Dept: PHARMACY | Facility: CLINIC | Age: 69
End: 2024-03-29
Payer: MEDICARE

## 2024-04-04 ENCOUNTER — TELEPHONE (OUTPATIENT)
Dept: PRIMARY CARE | Facility: CLINIC | Age: 69
End: 2024-04-04
Payer: MEDICARE

## 2024-04-04 DIAGNOSIS — M62.838 MUSCLE SPASM: ICD-10-CM

## 2024-04-04 RX ORDER — DIAZEPAM 5 MG/1
5 TABLET ORAL DAILY PRN
Qty: 30 TABLET | Refills: 0 | Status: SHIPPED | OUTPATIENT
Start: 2024-04-04 | End: 2024-04-08 | Stop reason: SDUPTHER

## 2024-04-06 ENCOUNTER — PHARMACY VISIT (OUTPATIENT)
Dept: PHARMACY | Facility: CLINIC | Age: 69
End: 2024-04-06

## 2024-04-06 PROCEDURE — RXOTC WILLOW AMBULATORY OTC CHARGE

## 2024-04-08 ENCOUNTER — TELEPHONE (OUTPATIENT)
Dept: PRIMARY CARE | Facility: CLINIC | Age: 69
End: 2024-04-08
Payer: MEDICARE

## 2024-04-08 DIAGNOSIS — M62.838 MUSCLE SPASM: ICD-10-CM

## 2024-04-08 RX ORDER — DIAZEPAM 5 MG/1
5 TABLET ORAL DAILY PRN
Qty: 30 TABLET | Refills: 0 | Status: SHIPPED | OUTPATIENT
Start: 2024-04-08 | End: 2024-10-05

## 2024-04-08 NOTE — TELEPHONE ENCOUNTER
Patient called in and stated he would like a refill on his diazapam called into  Pharmacy in [place].    Thank you

## 2024-04-09 ENCOUNTER — PHARMACY VISIT (OUTPATIENT)
Dept: PHARMACY | Facility: CLINIC | Age: 69
End: 2024-04-09
Payer: MEDICARE

## 2024-04-09 PROCEDURE — RXMED WILLOW AMBULATORY MEDICATION CHARGE

## 2024-04-09 RX ORDER — MORPHINE SULFATE 15 MG/1
TABLET, FILM COATED, EXTENDED RELEASE ORAL
Qty: 56 TABLET | Refills: 0 | OUTPATIENT
Start: 2024-04-09

## 2024-04-09 RX ORDER — MORPHINE SULFATE 15 MG/1
TABLET, FILM COATED, EXTENDED RELEASE ORAL
Qty: 56 TABLET | Refills: 0 | OUTPATIENT
Start: 2024-05-07 | End: 2024-06-05 | Stop reason: SDUPTHER

## 2024-04-09 RX ORDER — HYDROCODONE BITARTRATE AND ACETAMINOPHEN 7.5; 325 MG/1; MG/1
TABLET ORAL
Qty: 84 TABLET | Refills: 0 | OUTPATIENT
Start: 2024-04-09

## 2024-04-09 RX ORDER — HYDROCODONE BITARTRATE AND ACETAMINOPHEN 7.5; 325 MG/1; MG/1
TABLET ORAL
Qty: 84 TABLET | Refills: 0 | OUTPATIENT
Start: 2024-05-07

## 2024-04-30 ENCOUNTER — OFFICE VISIT (OUTPATIENT)
Dept: PRIMARY CARE | Facility: CLINIC | Age: 69
End: 2024-04-30
Payer: MEDICARE

## 2024-04-30 VITALS
BODY MASS INDEX: 28.4 KG/M2 | OXYGEN SATURATION: 97 % | WEIGHT: 172 LBS | SYSTOLIC BLOOD PRESSURE: 122 MMHG | DIASTOLIC BLOOD PRESSURE: 72 MMHG | HEART RATE: 79 BPM

## 2024-04-30 DIAGNOSIS — J30.89 PERENNIAL ALLERGIC RHINITIS: Primary | ICD-10-CM

## 2024-04-30 DIAGNOSIS — M06.4 INFLAMMATORY POLYARTHRITIS (MULTI): ICD-10-CM

## 2024-04-30 DIAGNOSIS — F41.1 GENERALIZED ANXIETY DISORDER: ICD-10-CM

## 2024-04-30 DIAGNOSIS — F33.9 DEPRESSION, RECURRENT (CMS-HCC): ICD-10-CM

## 2024-04-30 DIAGNOSIS — I10 PRIMARY HYPERTENSION: ICD-10-CM

## 2024-04-30 DIAGNOSIS — I25.10 CAD IN NATIVE ARTERY: ICD-10-CM

## 2024-04-30 DIAGNOSIS — R13.19 ESOPHAGEAL DYSPHAGIA: ICD-10-CM

## 2024-04-30 DIAGNOSIS — I48.0 PAROXYSMAL ATRIAL FIBRILLATION (MULTI): ICD-10-CM

## 2024-04-30 DIAGNOSIS — G43.709 CHRONIC MIGRAINE WITHOUT AURA WITHOUT STATUS MIGRAINOSUS, NOT INTRACTABLE: ICD-10-CM

## 2024-04-30 PROCEDURE — 1160F RVW MEDS BY RX/DR IN RCRD: CPT | Performed by: FAMILY MEDICINE

## 2024-04-30 PROCEDURE — 99214 OFFICE O/P EST MOD 30 MIN: CPT | Performed by: FAMILY MEDICINE

## 2024-04-30 PROCEDURE — 1036F TOBACCO NON-USER: CPT | Performed by: FAMILY MEDICINE

## 2024-04-30 PROCEDURE — 1159F MED LIST DOCD IN RCRD: CPT | Performed by: FAMILY MEDICINE

## 2024-04-30 PROCEDURE — 3008F BODY MASS INDEX DOCD: CPT | Performed by: FAMILY MEDICINE

## 2024-04-30 PROCEDURE — 3078F DIAST BP <80 MM HG: CPT | Performed by: FAMILY MEDICINE

## 2024-04-30 PROCEDURE — 3074F SYST BP LT 130 MM HG: CPT | Performed by: FAMILY MEDICINE

## 2024-04-30 RX ORDER — LOSARTAN POTASSIUM 50 MG/1
50 TABLET ORAL DAILY
Qty: 90 TABLET | Refills: 3 | Status: SHIPPED | OUTPATIENT
Start: 2024-04-30

## 2024-04-30 RX ORDER — FLUTICASONE PROPIONATE 50 MCG
2 SPRAY, SUSPENSION (ML) NASAL DAILY
Qty: 16 G | Refills: 12 | Status: SHIPPED | OUTPATIENT
Start: 2024-04-30 | End: 2025-04-30

## 2024-04-30 NOTE — PROGRESS NOTES
Subjective   Patient ID: Remy Abdi is a 68 y.o. male who presents for Med Management.    HPI CAD and Afib - Since his last visit he has had a Watchman procedure and now able to stop the Eliquis.  He was having side effects. Blood pressure is better now with stopping that.  Says he was told he would be able to stop ASA. Then only on Plavix.  Still in Atrial Fibrillation.  No chest pain. Dyspnea is better than it was. It seemed when he had Plavix added he got wiped out. Now better off of Eliquis.     Allergies acting up and loratadine not working. Cannot take INS due to nose bleeds. But may try again now this time as the Eliquis is done.      Anemia is better at 11.8. Indices good so likely has sufficient iron.. It causes problems so will stop that and check again in a few months     Hiatal Hernia finally doing pretty well unless he exerts.  No blood or melena.. Loose stools about once a week. Still had Links procedure     Continues to treat chronic pain with Dr Velazquez. On Morphine and hydrocodone.  Has Narcan. This is for back, neck pain and headaches     Depression and anxiety seems to be some better. 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        [facility] - GALI with [place] Dr Knott and Juan F     UDS 1/30/24 as expected for medication profile   CSA 1/30/24  I have personally reviewed the patients OARRS report. I have considered the risks of abuse, addiction and diversion. I believe it is clinically appropriate to continue to prescribe this medication.  Still opiates per Dr Cobb.    Review of Systems    Objective   /72 (BP Location: Left arm, Patient Position: Sitting)   Pulse 79   Wt 78 kg (172 lb)   SpO2 97%   BMI 28.40 kg/m²     Physical Exam  Vitals reviewed.   Constitutional:       General: He is not in acute distress.     Appearance: Normal appearance.   HENT:      Head: Normocephalic.      Right Ear: Tympanic membrane, ear canal and external ear normal.      Left Ear:  Tympanic membrane, ear canal and external ear normal.      Nose: Nose normal.      Mouth/Throat:      Mouth: Mucous membranes are moist.      Pharynx: Oropharynx is clear.      Comments: Dentures  Hoarse due to intubation   Eyes:      Extraocular Movements: Extraocular movements intact.      Conjunctiva/sclera: Conjunctivae normal.      Pupils: Pupils are equal, round, and reactive to light.   Neck:      Vascular: No carotid bruit.   Cardiovascular:      Rate and Rhythm: Normal rate and regular rhythm.      Pulses: Normal pulses.      Heart sounds: Normal heart sounds. No murmur heard.  Pulmonary:      Effort: Pulmonary effort is normal. No respiratory distress.      Breath sounds: Normal breath sounds.   Abdominal:      General: Abdomen is flat. Bowel sounds are normal. There is no distension.      Palpations: Abdomen is soft. There is no mass.      Tenderness: There is no abdominal tenderness.   Musculoskeletal:      Cervical back: Normal range of motion and neck supple. No tenderness.   Lymphadenopathy:      Cervical: No cervical adenopathy.   Skin:     General: Skin is warm and dry.      Findings: No rash.   Neurological:      General: No focal deficit present.      Mental Status: He is alert and oriented to person, place, and time.   Psychiatric:         Mood and Affect: Mood normal.         Thought Content: Thought content normal.         Judgment: Judgment normal.         Assessment/Plan   Diagnoses and all orders for this visit:  Perennial allergic rhinitis  -     fluticasone (Flonase) 50 mcg/actuation nasal spray; Administer 2 sprays into each nostril once daily. Shake gently. Before first use, prime pump. After use, clean tip and replace cap.  Primary hypertension  -     losartan (Cozaar) 50 mg tablet; Take 1 tablet (50 mg) by mouth once daily.  Paroxysmal atrial fibrillation (Multi)  -     Follow Up In Primary Care - Established; Future  Depression, recurrent (CMS-HCC)  Esophageal dysphagia  Generalized  anxiety disorder  CAD in native artery  Inflammatory polyarthritis (Multi)  Chronic migraine without aura without status migrainosus, not intractable  Other orders  -     Follow Up In Primary Care - Established

## 2024-05-09 ENCOUNTER — PHARMACY VISIT (OUTPATIENT)
Dept: PHARMACY | Facility: CLINIC | Age: 69
End: 2024-05-09
Payer: MEDICARE

## 2024-05-09 PROCEDURE — RXMED WILLOW AMBULATORY MEDICATION CHARGE

## 2024-06-05 ENCOUNTER — PHARMACY VISIT (OUTPATIENT)
Dept: PHARMACY | Facility: CLINIC | Age: 69
End: 2024-06-05
Payer: MEDICARE

## 2024-06-05 PROCEDURE — RXOTC WILLOW AMBULATORY OTC CHARGE

## 2024-06-05 PROCEDURE — RXMED WILLOW AMBULATORY MEDICATION CHARGE

## 2024-06-05 RX ORDER — MORPHINE SULFATE 15 MG/1
TABLET, FILM COATED, EXTENDED RELEASE ORAL
Qty: 56 TABLET | Refills: 0 | OUTPATIENT
Start: 2024-06-05

## 2024-06-05 RX ORDER — MORPHINE SULFATE 15 MG/1
TABLET, FILM COATED, EXTENDED RELEASE ORAL
Qty: 56 TABLET | Refills: 0 | OUTPATIENT
Start: 2024-07-03

## 2024-06-05 RX ORDER — HYDROCODONE BITARTRATE AND ACETAMINOPHEN 7.5; 325 MG/1; MG/1
TABLET ORAL
Qty: 84 TABLET | Refills: 0 | OUTPATIENT
Start: 2024-06-05

## 2024-06-05 RX ORDER — HYDROCODONE BITARTRATE AND ACETAMINOPHEN 7.5; 325 MG/1; MG/1
TABLET ORAL
Qty: 84 TABLET | Refills: 0 | OUTPATIENT
Start: 2024-07-03

## 2024-06-13 ENCOUNTER — TELEPHONE (OUTPATIENT)
Dept: PRIMARY CARE | Facility: CLINIC | Age: 69
End: 2024-06-13
Payer: MEDICARE

## 2024-06-13 DIAGNOSIS — M62.838 MUSCLE SPASM: ICD-10-CM

## 2024-06-13 PROCEDURE — RXMED WILLOW AMBULATORY MEDICATION CHARGE

## 2024-06-13 RX ORDER — DIAZEPAM 5 MG/1
5 TABLET ORAL DAILY PRN
Qty: 30 TABLET | Refills: 0 | Status: SHIPPED | OUTPATIENT
Start: 2024-06-13 | End: 2024-12-10

## 2024-06-13 NOTE — TELEPHONE ENCOUNTER
Patient called in and would like a refill on the following medication.....diazePAM (Valium) 5 mg tablet called into [facility] pharmacy.    Thank you

## 2024-06-18 ENCOUNTER — PHARMACY VISIT (OUTPATIENT)
Dept: PHARMACY | Facility: CLINIC | Age: 69
End: 2024-06-18
Payer: MEDICARE

## 2024-06-19 ENCOUNTER — TELEPHONE (OUTPATIENT)
Dept: PRIMARY CARE | Facility: CLINIC | Age: 69
End: 2024-06-19
Payer: MEDICARE

## 2024-07-03 PROCEDURE — RXMED WILLOW AMBULATORY MEDICATION CHARGE

## 2024-07-05 ENCOUNTER — PHARMACY VISIT (OUTPATIENT)
Dept: PHARMACY | Facility: CLINIC | Age: 69
End: 2024-07-05
Payer: MEDICARE

## 2024-07-16 ENCOUNTER — PHARMACY VISIT (OUTPATIENT)
Dept: PHARMACY | Facility: CLINIC | Age: 69
End: 2024-07-16
Payer: MEDICARE

## 2024-07-16 PROCEDURE — RXMED WILLOW AMBULATORY MEDICATION CHARGE

## 2024-07-16 RX ORDER — DILTIAZEM HYDROCHLORIDE 120 MG/1
CAPSULE, COATED, EXTENDED RELEASE ORAL
Qty: 14 CAPSULE | Refills: 0 | OUTPATIENT
Start: 2024-07-16

## 2024-07-22 ENCOUNTER — TELEMEDICINE (OUTPATIENT)
Dept: PRIMARY CARE | Facility: CLINIC | Age: 69
End: 2024-07-22
Payer: MEDICARE

## 2024-07-22 ENCOUNTER — TELEPHONE (OUTPATIENT)
Dept: PRIMARY CARE | Facility: CLINIC | Age: 69
End: 2024-07-22
Payer: MEDICARE

## 2024-07-22 ENCOUNTER — PHARMACY VISIT (OUTPATIENT)
Dept: PHARMACY | Facility: CLINIC | Age: 69
End: 2024-07-22
Payer: MEDICARE

## 2024-07-22 DIAGNOSIS — J04.0 LARYNGITIS, ACUTE: Primary | ICD-10-CM

## 2024-07-22 PROCEDURE — 99442 PR PHYS/QHP TELEPHONE EVALUATION 11-20 MIN: CPT | Performed by: FAMILY MEDICINE

## 2024-07-22 PROCEDURE — 1036F TOBACCO NON-USER: CPT | Performed by: FAMILY MEDICINE

## 2024-07-22 PROCEDURE — 1159F MED LIST DOCD IN RCRD: CPT | Performed by: FAMILY MEDICINE

## 2024-07-22 PROCEDURE — RXMED WILLOW AMBULATORY MEDICATION CHARGE

## 2024-07-22 RX ORDER — AZITHROMYCIN 500 MG/1
500 TABLET, FILM COATED ORAL DAILY
Qty: 5 TABLET | Refills: 0 | Status: SHIPPED | OUTPATIENT
Start: 2024-07-22 | End: 2024-07-27

## 2024-07-22 RX ORDER — DULOXETIN HYDROCHLORIDE 60 MG/1
60 CAPSULE, DELAYED RELEASE ORAL DAILY
COMMUNITY

## 2024-07-22 NOTE — PROGRESS NOTES
Subjective   Patient ID: Remy Abdi is a 69 y.o. male who presents for Headache and Sore Throat (Runny nose, dizziness and had diarrhea.  Symptoms started 7/19/24.  Wife Pos. Covid test on 7/19/24).  Virtual or Telephone Consent    A telephone visit (audio only) between the patient (at the originating site) and the provider (at the distant site) was utilized to provide this telehealth service.   Verbal consent was requested and obtained from Remy Abdi on this date, 07/22/24 for a telehealth visit.    HPI   Sick for 3 days   RN, SN, PND, hoarse, ST, Lightheaded, diarrhea,   No blood in stools  Cough with mucus  No fever but feverish  Hoarse, headache, kind of achy  GFR WNL  Multiple meds  On Coricidin HP, MVI and elderberry  Paxlovid is prohibitively expensive for him  Will treat for secondary infection with Azithromycin    Review of Systems    Objective   There were no vitals taken for this visit.    Physical Exam  Phone visit with hoarseness but not dyspnea or confusion      Assessment/Plan   Diagnoses and all orders for this visit:  Laryngitis, acute  -     azithromycin (Zithromax) 500 mg tablet; Take 1 tablet (500 mg) by mouth once daily for 5 days.

## 2024-07-22 NOTE — TELEPHONE ENCOUNTER
States his wife tested positive for covid, he has no tests left to test himself, is having headache, nausea, dizziness, sore throat, runny nose, diarrhea. Can you call in something to [facility]?

## 2024-07-30 ENCOUNTER — APPOINTMENT (OUTPATIENT)
Dept: PRIMARY CARE | Facility: CLINIC | Age: 69
End: 2024-07-30
Payer: MEDICARE

## 2024-07-30 ENCOUNTER — PHARMACY VISIT (OUTPATIENT)
Dept: PHARMACY | Facility: CLINIC | Age: 69
End: 2024-07-30
Payer: MEDICARE

## 2024-07-30 VITALS
OXYGEN SATURATION: 97 % | BODY MASS INDEX: 28.24 KG/M2 | SYSTOLIC BLOOD PRESSURE: 116 MMHG | HEART RATE: 83 BPM | WEIGHT: 171 LBS | DIASTOLIC BLOOD PRESSURE: 82 MMHG

## 2024-07-30 DIAGNOSIS — J32.9 RECURRENT SINUSITIS: ICD-10-CM

## 2024-07-30 DIAGNOSIS — M54.9 BACK PAIN, UNSPECIFIED BACK LOCATION, UNSPECIFIED BACK PAIN LATERALITY, UNSPECIFIED CHRONICITY: ICD-10-CM

## 2024-07-30 DIAGNOSIS — J30.89 PERENNIAL ALLERGIC RHINITIS: ICD-10-CM

## 2024-07-30 DIAGNOSIS — Z87.19 HISTORY OF REPAIR OF HIATAL HERNIA: ICD-10-CM

## 2024-07-30 DIAGNOSIS — R13.19 ESOPHAGEAL DYSPHAGIA: ICD-10-CM

## 2024-07-30 DIAGNOSIS — Z98.890 HISTORY OF REPAIR OF HIATAL HERNIA: ICD-10-CM

## 2024-07-30 DIAGNOSIS — I48.0 PAROXYSMAL ATRIAL FIBRILLATION (MULTI): ICD-10-CM

## 2024-07-30 DIAGNOSIS — Z85.118 HISTORY OF LUNG CANCER: ICD-10-CM

## 2024-07-30 DIAGNOSIS — F41.1 GENERALIZED ANXIETY DISORDER: ICD-10-CM

## 2024-07-30 DIAGNOSIS — M62.838 MUSCLE SPASM: ICD-10-CM

## 2024-07-30 DIAGNOSIS — M80.00XA AGE-RELATED OSTEOPOROSIS WITH CURRENT PATHOLOGICAL FRACTURE, UNSPECIFIED SITE, INITIAL ENCOUNTER FOR FRACTURE: Primary | ICD-10-CM

## 2024-07-30 DIAGNOSIS — G44.86 CERVICOGENIC HEADACHE: ICD-10-CM

## 2024-07-30 DIAGNOSIS — J45.41 MODERATE PERSISTENT ASTHMA WITH ACUTE EXACERBATION (HHS-HCC): ICD-10-CM

## 2024-07-30 DIAGNOSIS — F33.9 DEPRESSION, RECURRENT (CMS-HCC): ICD-10-CM

## 2024-07-30 DIAGNOSIS — K52.9 CHRONIC DIARRHEA: ICD-10-CM

## 2024-07-30 DIAGNOSIS — I25.10 CAD IN NATIVE ARTERY: ICD-10-CM

## 2024-07-30 DIAGNOSIS — Z95.1 HISTORY OF CORONARY ARTERY BYPASS GRAFT: ICD-10-CM

## 2024-07-30 PROBLEM — R53.83 FATIGUE: Status: RESOLVED | Noted: 2023-01-18 | Resolved: 2024-07-30

## 2024-07-30 PROBLEM — K21.9 HIATAL HERNIA WITH GERD: Status: RESOLVED | Noted: 2023-01-18 | Resolved: 2024-07-30

## 2024-07-30 PROBLEM — K44.9 HIATAL HERNIA WITH GERD: Status: RESOLVED | Noted: 2023-01-18 | Resolved: 2024-07-30

## 2024-07-30 PROBLEM — T78.40XA ALLERGIC REACTION TO DRUG: Status: RESOLVED | Noted: 2023-01-18 | Resolved: 2024-07-30

## 2024-07-30 PROBLEM — R14.2 GASEOUS REGURGITATION: Status: RESOLVED | Noted: 2019-10-21 | Resolved: 2024-07-30

## 2024-07-30 PROCEDURE — 1159F MED LIST DOCD IN RCRD: CPT | Performed by: FAMILY MEDICINE

## 2024-07-30 PROCEDURE — 3079F DIAST BP 80-89 MM HG: CPT | Performed by: FAMILY MEDICINE

## 2024-07-30 PROCEDURE — 1160F RVW MEDS BY RX/DR IN RCRD: CPT | Performed by: FAMILY MEDICINE

## 2024-07-30 PROCEDURE — RXMED WILLOW AMBULATORY MEDICATION CHARGE

## 2024-07-30 PROCEDURE — 1036F TOBACCO NON-USER: CPT | Performed by: FAMILY MEDICINE

## 2024-07-30 PROCEDURE — 3074F SYST BP LT 130 MM HG: CPT | Performed by: FAMILY MEDICINE

## 2024-07-30 PROCEDURE — 99214 OFFICE O/P EST MOD 30 MIN: CPT | Performed by: FAMILY MEDICINE

## 2024-07-30 RX ORDER — GABAPENTIN 600 MG/1
1200 TABLET ORAL NIGHTLY
Qty: 180 TABLET | Refills: 1 | Status: SHIPPED | OUTPATIENT
Start: 2024-07-30

## 2024-07-30 RX ORDER — DIAZEPAM 5 MG/1
5 TABLET ORAL DAILY PRN
Qty: 30 TABLET | Refills: 0 | Status: SHIPPED | OUTPATIENT
Start: 2024-07-30 | End: 2025-01-26

## 2024-07-30 NOTE — PROGRESS NOTES
Subjective   Patient ID: Remy Abdi is a 69 y.o. male who presents for Med Management (Pos covid last week).    HPI   CAD and Afib - Had a Watchman procedure 4/18/24 and so he stopped Eliquis.  Blood pressure is better now with stopping that.  Says he was told he would be able to stop ASA. Then only on Plavix.  Still in Atrial Fibrillation.  No chest pain. Dyspnea is better.   It seemed when he had Plavix added he got wiped out. Now better off of Eliquis.      Asthma and Allergies acting up and loratadine not working. Cannot take INS due to nose bleeds. But may try again now this time as the Eliquis is done.  Has had Covid for a week and slowly getting but still a lot of sweats and runny nose. No increase in dyspnea. Cough from the drainage. Can try saline nasal spray. Hoarseness has been worse with this and to see ENT to evaluate per oncology. Inhaler generally not needed. A bit with Covid       Anemia is better at 12.2 Indices good so likely has sufficient iron. This was without iron. It caused problems so stopped that.      Hiatal Hernia finally doing pretty well unless he exerts.  No blood or melena.. Loose stools about once a week. Still had Links in.   Still some dysphagia and then vomiting      Continues to treat chronic pain with Dr Velazquez. On Morphine and hydrocodone.  Has Narcan. This is for back, neck pain and headaches      Depression and anxiety seems to be some better. 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         Osteoporosis - unable to tolerate Fosamax or Prolia.  -2.8 in Hip and -4.3 in the forearm.     Inflammatory polyarthritis - worse with weather changes but generally not to bad at present.     [facility] with [place] Dr Knott and Juan F      UDS 1/30/24 as expected for medication profile   CSA 1/30/24  I have personally reviewed the patients OARRS report. I have considered the risks of abuse, addiction and diversion. I believe it is clinically appropriate to  continue to prescribe this medication.  Still opiates per Dr Cobb.    Review of Systems    Objective   /82 (BP Location: Left arm, Patient Position: Sitting)   Pulse 83   Wt 77.6 kg (171 lb)   SpO2 97%   BMI 28.24 kg/m²     Physical Exam  Vitals reviewed.   Constitutional:       General: He is not in acute distress.     Appearance: Normal appearance. He is normal weight.   HENT:      Head: Normocephalic.      Right Ear: Tympanic membrane, ear canal and external ear normal.      Left Ear: Tympanic membrane, ear canal and external ear normal.      Nose: Nose normal.      Mouth/Throat:      Mouth: Mucous membranes are moist.      Pharynx: Oropharynx is clear.      Comments: Hoarse   Dentures   Eyes:      Extraocular Movements: Extraocular movements intact.      Conjunctiva/sclera: Conjunctivae normal.      Pupils: Pupils are equal, round, and reactive to light.   Neck:      Vascular: No carotid bruit.   Cardiovascular:      Rate and Rhythm: Normal rate and regular rhythm.      Pulses: Normal pulses.      Heart sounds: Normal heart sounds. No murmur heard.  Pulmonary:      Effort: Pulmonary effort is normal. No respiratory distress.      Breath sounds: Normal breath sounds.   Abdominal:      General: Abdomen is flat. Bowel sounds are normal. There is no distension.      Palpations: Abdomen is soft. There is no mass.      Tenderness: There is no abdominal tenderness.   Musculoskeletal:         General: Tenderness (paraspinal) present. Normal range of motion.      Cervical back: Normal range of motion and neck supple. No tenderness.   Lymphadenopathy:      Cervical: No cervical adenopathy.   Skin:     General: Skin is warm and dry.      Findings: No rash.   Neurological:      General: No focal deficit present.      Mental Status: He is alert and oriented to person, place, and time.   Psychiatric:         Mood and Affect: Mood normal.         Thought Content: Thought content normal.         Judgment:  Judgment normal.         Assessment/Plan   Diagnoses and all orders for this visit:  Age-related osteoporosis with current pathological fracture, unspecified site, initial encounter for fracture  Paroxysmal atrial fibrillation (Multi)  -     Follow Up In Primary Care - Established  Muscle spasm  -     diazePAM (Valium) 5 mg tablet; Take 1 tablet (5 mg) by mouth once daily as needed for anxiety.  Back pain, unspecified back location, unspecified back pain laterality, unspecified chronicity  -     gabapentin (Neurontin) 600 mg tablet; Take 2 tablets (1,200 mg) by mouth once daily at bedtime.  CAD in native artery  Cervicogenic headache  Chronic diarrhea  Depression, recurrent (CMS-HCC)  Esophageal dysphagia  Generalized anxiety disorder  History of repair of hiatal hernia  History of coronary artery bypass graft  History of lung cancer  Moderate persistent asthma with acute exacerbation ([facility]-HCC)  Perennial allergic rhinitis  Recurrent sinusitis

## 2024-07-31 PROCEDURE — RXMED WILLOW AMBULATORY MEDICATION CHARGE

## 2024-08-04 ENCOUNTER — PHARMACY VISIT (OUTPATIENT)
Dept: PHARMACY | Facility: CLINIC | Age: 69
End: 2024-08-04
Payer: MEDICARE

## 2024-08-31 PROCEDURE — RXMED WILLOW AMBULATORY MEDICATION CHARGE

## 2024-09-01 ENCOUNTER — PHARMACY VISIT (OUTPATIENT)
Dept: PHARMACY | Facility: CLINIC | Age: 69
End: 2024-09-01
Payer: MEDICARE

## 2024-09-01 PROCEDURE — RXMED WILLOW AMBULATORY MEDICATION CHARGE

## 2024-09-03 ENCOUNTER — PHARMACY VISIT (OUTPATIENT)
Dept: PHARMACY | Facility: CLINIC | Age: 69
End: 2024-09-03
Payer: MEDICARE

## 2024-09-20 PROBLEM — C32.9 LARYNGEAL CANCER (MULTI): Status: ACTIVE | Noted: 2024-09-20

## 2024-09-20 PROBLEM — J38.7 SUPRAGLOTTIC MASS: Status: ACTIVE | Noted: 2024-08-19

## 2024-09-25 ENCOUNTER — TELEPHONE (OUTPATIENT)
Dept: PRIMARY CARE | Facility: CLINIC | Age: 69
End: 2024-09-25
Payer: MEDICARE

## 2024-09-25 DIAGNOSIS — M62.838 MUSCLE SPASM: ICD-10-CM

## 2024-09-25 PROCEDURE — RXMED WILLOW AMBULATORY MEDICATION CHARGE

## 2024-09-25 RX ORDER — DIAZEPAM 5 MG/1
5 TABLET ORAL DAILY PRN
Qty: 30 TABLET | Refills: 0 | Status: SHIPPED | OUTPATIENT
Start: 2024-09-25 | End: 2025-03-24

## 2024-09-26 ENCOUNTER — PHARMACY VISIT (OUTPATIENT)
Dept: PHARMACY | Facility: CLINIC | Age: 69
End: 2024-09-26
Payer: MEDICARE

## 2024-09-27 PROBLEM — R49.0 DYSPHONIA: Status: ACTIVE | Noted: 2024-09-17

## 2024-10-10 ENCOUNTER — TELEPHONE (OUTPATIENT)
Dept: PRIMARY CARE | Facility: CLINIC | Age: 69
End: 2024-10-10
Payer: MEDICARE

## 2024-10-10 NOTE — TELEPHONE ENCOUNTER
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  He will need diazepam to calm him down due to claustrophobia for this treatment. He would like a call on his cell phone [phone].

## 2024-10-11 ENCOUNTER — OFFICE VISIT (OUTPATIENT)
Dept: PRIMARY CARE | Facility: CLINIC | Age: 69
End: 2024-10-11
Payer: MEDICARE

## 2024-10-11 VITALS
BODY MASS INDEX: 28.07 KG/M2 | OXYGEN SATURATION: 95 % | SYSTOLIC BLOOD PRESSURE: 110 MMHG | HEART RATE: 82 BPM | DIASTOLIC BLOOD PRESSURE: 64 MMHG | WEIGHT: 170 LBS

## 2024-10-11 DIAGNOSIS — F41.9 ANXIETY: ICD-10-CM

## 2024-10-11 DIAGNOSIS — M62.838 MUSCLE SPASM: ICD-10-CM

## 2024-10-11 PROCEDURE — 3074F SYST BP LT 130 MM HG: CPT | Performed by: FAMILY MEDICINE

## 2024-10-11 PROCEDURE — 1036F TOBACCO NON-USER: CPT | Performed by: FAMILY MEDICINE

## 2024-10-11 PROCEDURE — 1159F MED LIST DOCD IN RCRD: CPT | Performed by: FAMILY MEDICINE

## 2024-10-11 PROCEDURE — 3078F DIAST BP <80 MM HG: CPT | Performed by: FAMILY MEDICINE

## 2024-10-11 PROCEDURE — RXMED WILLOW AMBULATORY MEDICATION CHARGE

## 2024-10-11 PROCEDURE — 1160F RVW MEDS BY RX/DR IN RCRD: CPT | Performed by: FAMILY MEDICINE

## 2024-10-11 PROCEDURE — 99213 OFFICE O/P EST LOW 20 MIN: CPT | Performed by: FAMILY MEDICINE

## 2024-10-11 RX ORDER — DEXAMETHASONE 4 MG/1
8 TABLET ORAL DAILY
COMMUNITY
Start: 2024-10-10

## 2024-10-11 RX ORDER — DIAZEPAM 5 MG/1
5 TABLET ORAL 2 TIMES DAILY PRN
Qty: 60 TABLET | Refills: 0 | Status: SHIPPED | OUTPATIENT
Start: 2024-10-11 | End: 2024-11-10

## 2024-10-11 RX ORDER — OLANZAPINE 5 MG/1
5 TABLET ORAL NIGHTLY
COMMUNITY
Start: 2024-10-10

## 2024-10-11 RX ORDER — PROCHLORPERAZINE MALEATE 10 MG
10 TABLET ORAL EVERY 6 HOURS PRN
COMMUNITY
Start: 2024-10-10

## 2024-10-11 NOTE — PROGRESS NOTES
Subjective   Patient ID: Remy Abdi is a 69 y.o. male who presents for Follow-up (Discuss med. Prior to to starting chemo and radiation).    HPI   He is having radiation and chemo for laryngeal cancer. Concerned with the radiation as he has claustrophobia with the mask. That will hold him in place. He is on hydrocodone and MScontin but will not be able to take Norco as he will get pain meds via the port. For now he feels he will be able to swallow pills but not sure he will always. Would like a refill on Valium which he has been on that he would take at hs for spasms. Now will need bid to cover before the treatments as well. He is aware of the risk of dual therapy and has Narcan at home.  to morphine via port. Also on gabapantin at hs.     I have personally reviewed the patients OARRS report. I have considered the risks of abuse, addiction and diversion. I believe it is clinically appropriate to continue to prescribe this medication.   UDS 1/30/24 as expected for medication profile   CSA 1/30/24      Review of Systems    Objective   /64 (BP Location: Left arm, Patient Position: Sitting)   Pulse 82   Wt 77.1 kg (170 lb)   SpO2 95%   BMI 28.07 kg/m²     Hoarse voice  Alert with NAD  Lungs clear with Good air movement  Heart RRR with no murmur  No edema  Good mood, affect, judgement, insight speech      Assessment/Plan   Diagnoses and all orders for this visit:  Muscle spasm  -     diazePAM (Valium) 5 mg tablet; Take 1 tablet (5 mg) by mouth 2 times a day as needed for anxiety.  Anxiety  -     diazePAM (Valium) 5 mg tablet; Take 1 tablet (5 mg) by mouth 2 times a day as needed for anxiety.

## 2024-10-17 ENCOUNTER — PHARMACY VISIT (OUTPATIENT)
Dept: PHARMACY | Facility: CLINIC | Age: 69
End: 2024-10-17
Payer: MEDICARE

## 2024-10-25 ENCOUNTER — PHARMACY VISIT (OUTPATIENT)
Dept: PHARMACY | Facility: CLINIC | Age: 69
End: 2024-10-25
Payer: MEDICARE

## 2024-10-25 PROCEDURE — RXMED WILLOW AMBULATORY MEDICATION CHARGE

## 2024-10-28 ENCOUNTER — TELEPHONE (OUTPATIENT)
Dept: PRIMARY CARE | Facility: CLINIC | Age: 69
End: 2024-10-28
Payer: MEDICARE

## 2024-10-28 DIAGNOSIS — G43.709 CHRONIC MIGRAINE WITHOUT AURA WITHOUT STATUS MIGRAINOSUS, NOT INTRACTABLE: ICD-10-CM

## 2024-10-31 ENCOUNTER — APPOINTMENT (OUTPATIENT)
Dept: PRIMARY CARE | Facility: CLINIC | Age: 69
End: 2024-10-31
Payer: MEDICARE

## 2024-10-31 DIAGNOSIS — G43.709 CHRONIC MIGRAINE WITHOUT AURA WITHOUT STATUS MIGRAINOSUS, NOT INTRACTABLE: Primary | ICD-10-CM

## 2024-10-31 RX ORDER — SUMATRIPTAN SUCCINATE 100 MG/1
100 TABLET ORAL ONCE AS NEEDED
Qty: 9 TABLET | Refills: 5 | Status: SHIPPED | OUTPATIENT
Start: 2024-10-31 | End: 2025-10-31

## 2024-11-11 PROBLEM — D50.9 IRON DEFICIENCY ANEMIA: Status: ACTIVE | Noted: 2024-10-17

## 2024-11-11 RX ORDER — SUCRALFATE 1 G/1
1 TABLET ORAL
COMMUNITY
Start: 2024-11-07

## 2024-11-20 PROCEDURE — RXMED WILLOW AMBULATORY MEDICATION CHARGE

## 2024-11-22 ENCOUNTER — PHARMACY VISIT (OUTPATIENT)
Dept: PHARMACY | Facility: CLINIC | Age: 69
End: 2024-11-22
Payer: MEDICARE

## 2024-12-10 ENCOUNTER — APPOINTMENT (OUTPATIENT)
Dept: PRIMARY CARE | Facility: CLINIC | Age: 69
End: 2024-12-10
Payer: MEDICARE

## 2024-12-10 VITALS
HEART RATE: 81 BPM | OXYGEN SATURATION: 95 % | WEIGHT: 166 LBS | DIASTOLIC BLOOD PRESSURE: 64 MMHG | BODY MASS INDEX: 27.41 KG/M2 | SYSTOLIC BLOOD PRESSURE: 118 MMHG

## 2024-12-10 DIAGNOSIS — C34.92 NSCLC OF LEFT LUNG (MULTI): ICD-10-CM

## 2024-12-10 DIAGNOSIS — F41.9 ANXIETY: ICD-10-CM

## 2024-12-10 DIAGNOSIS — I48.0 PAROXYSMAL ATRIAL FIBRILLATION (MULTI): ICD-10-CM

## 2024-12-10 DIAGNOSIS — C32.9 LARYNGEAL CANCER (MULTI): ICD-10-CM

## 2024-12-10 DIAGNOSIS — J45.41 MODERATE PERSISTENT ASTHMA WITH ACUTE EXACERBATION (HHS-HCC): ICD-10-CM

## 2024-12-10 DIAGNOSIS — M54.42 CHRONIC BILATERAL LOW BACK PAIN WITH BILATERAL SCIATICA: Primary | ICD-10-CM

## 2024-12-10 DIAGNOSIS — M62.838 MUSCLE SPASM: ICD-10-CM

## 2024-12-10 DIAGNOSIS — M54.41 CHRONIC BILATERAL LOW BACK PAIN WITH BILATERAL SCIATICA: Primary | ICD-10-CM

## 2024-12-10 DIAGNOSIS — D50.8 IRON DEFICIENCY ANEMIA SECONDARY TO INADEQUATE DIETARY IRON INTAKE: ICD-10-CM

## 2024-12-10 DIAGNOSIS — G89.29 CHRONIC BILATERAL LOW BACK PAIN WITH BILATERAL SCIATICA: Primary | ICD-10-CM

## 2024-12-10 DIAGNOSIS — M06.9 RHEUMATOID ARTHRITIS INVOLVING MULTIPLE SITES, UNSPECIFIED WHETHER RHEUMATOID FACTOR PRESENT (MULTI): ICD-10-CM

## 2024-12-10 DIAGNOSIS — E66.3 OVERWEIGHT WITH BODY MASS INDEX (BMI) OF 28 TO 28.9 IN ADULT: ICD-10-CM

## 2024-12-10 DIAGNOSIS — Z87.19 HISTORY OF REPAIR OF HIATAL HERNIA: ICD-10-CM

## 2024-12-10 DIAGNOSIS — J30.89 PERENNIAL ALLERGIC RHINITIS: ICD-10-CM

## 2024-12-10 DIAGNOSIS — F41.1 GENERALIZED ANXIETY DISORDER: ICD-10-CM

## 2024-12-10 DIAGNOSIS — F33.9 DEPRESSION, RECURRENT (CMS-HCC): ICD-10-CM

## 2024-12-10 DIAGNOSIS — Z98.890 HISTORY OF REPAIR OF HIATAL HERNIA: ICD-10-CM

## 2024-12-10 PROBLEM — J38.7 SUPRAGLOTTIC MASS: Status: RESOLVED | Noted: 2024-08-19 | Resolved: 2024-12-10

## 2024-12-10 PROBLEM — R49.0 DYSPHONIA: Status: RESOLVED | Noted: 2024-09-17 | Resolved: 2024-12-10

## 2024-12-10 PROBLEM — J32.9 RECURRENT SINUSITIS: Status: RESOLVED | Noted: 2023-01-18 | Resolved: 2024-12-10

## 2024-12-10 PROBLEM — Z85.118 HISTORY OF LUNG CANCER: Status: RESOLVED | Noted: 2023-05-25 | Resolved: 2024-12-10

## 2024-12-10 PROCEDURE — 3078F DIAST BP <80 MM HG: CPT | Performed by: FAMILY MEDICINE

## 2024-12-10 PROCEDURE — 1036F TOBACCO NON-USER: CPT | Performed by: FAMILY MEDICINE

## 2024-12-10 PROCEDURE — RXMED WILLOW AMBULATORY MEDICATION CHARGE

## 2024-12-10 PROCEDURE — 99214 OFFICE O/P EST MOD 30 MIN: CPT | Performed by: FAMILY MEDICINE

## 2024-12-10 PROCEDURE — G2211 COMPLEX E/M VISIT ADD ON: HCPCS | Performed by: FAMILY MEDICINE

## 2024-12-10 PROCEDURE — 3074F SYST BP LT 130 MM HG: CPT | Performed by: FAMILY MEDICINE

## 2024-12-10 PROCEDURE — 1159F MED LIST DOCD IN RCRD: CPT | Performed by: FAMILY MEDICINE

## 2024-12-10 RX ORDER — SILVER SULFADIAZINE 10 G/1000G
1 CREAM TOPICAL 2 TIMES DAILY
COMMUNITY
Start: 2024-12-05

## 2024-12-10 RX ORDER — DILTIAZEM HYDROCHLORIDE 180 MG/1
180 CAPSULE, COATED, EXTENDED RELEASE ORAL DAILY
COMMUNITY
Start: 2024-11-06

## 2024-12-10 RX ORDER — DIAZEPAM 5 MG/1
5 TABLET ORAL 2 TIMES DAILY PRN
Qty: 60 TABLET | Refills: 1 | Status: SHIPPED | OUTPATIENT
Start: 2024-12-10 | End: 2025-03-10

## 2024-12-10 NOTE — PROGRESS NOTES
Subjective   Patient ID: Remy Abdi is a 69 y.o. male who presents for Med Management.    HPI   Has been going through the ringer lately with chemo and radiation for laryngeal cancer.  Has a PEG tube in as of last week.  Lung cancer a year ago and doing pretty well. Has a spot that they are following. He is having genetic studies to find why he keeps getting cancer.    Atrial fibrillation is doing will in Sinus.   One chemo caused chest pains but not affecting heart.    Dr Velazquez has him on Morphine and hydrocodone for pain in neck and low back. Also shoulder that needs RSR.  Cannot abduct.  Worse with laying on radiation table. The Valium for the anxiety helps him to rest at hs and to tolerate the table for radiation. Will get restless legs without this.  Also gets claustrophobic. On Duloxetine as well   Allergies not too bad at present.  Needs 2 of the Valium now instead of once a day in the past.  Aware of interaction.   GERD Lynx does not seem to be working. Had EGD recently  RA still bad with weather changes. Not on medication at present due to above    Asthma has been good on occasional inhaler     Reviewed labs   Hemoglobin 8.9 needed infusion for low iron  CMP WNL   Lipids a year ago looks good on Lipitor       I have personally reviewed the patients OARRS report. I have considered the risks of abuse, addiction and diversion. I believe it is clinically appropriate to continue to prescribe this medication.   UDS 1/30/24 as expected for medication profile   CSA 1/30/24  Review of Systems    Objective   /64 (BP Location: Left arm, Patient Position: Sitting)   Pulse 81   Wt 75.3 kg (166 lb)   SpO2 95%   BMI 27.41 kg/m²     Physical Exam  Constitutional:       Appearance: Normal appearance. He is normal weight.   HENT:      Head: Normocephalic.      Right Ear: Tympanic membrane, ear canal and external ear normal.      Left Ear: Tympanic membrane, ear canal and external ear normal.      Nose: Nose  normal.      Mouth/Throat:      Mouth: Mucous membranes are moist.      Pharynx: Oropharynx is clear.      Comments: Edentulous   Hoarse   Eyes:      Conjunctiva/sclera: Conjunctivae normal.   Neck:      Comments: Radiation burns on neck   Cardiovascular:      Rate and Rhythm: Normal rate and regular rhythm.      Pulses: Normal pulses.      Heart sounds: No murmur heard.  Pulmonary:      Effort: Pulmonary effort is normal. No respiratory distress.      Breath sounds: Normal breath sounds. No wheezing.      Comments: Has port left upper chest   Abdominal:      General: Abdomen is flat. Bowel sounds are normal. There is no distension.      Palpations: There is no mass.      Tenderness: There is no abdominal tenderness. There is no guarding.      Comments: PEG tube in upper abdomen    Musculoskeletal:      Cervical back: Normal range of motion and neck supple. Tenderness (back and fron of neck) present.   Skin:     General: Skin is warm and dry.   Neurological:      Mental Status: He is alert and oriented to person, place, and time.   Psychiatric:         Mood and Affect: Mood normal.         Behavior: Behavior normal.         Thought Content: Thought content normal.         Judgment: Judgment normal.         Assessment/Plan   Diagnoses and all orders for this visit:  Chronic bilateral low back pain with bilateral sciatica  -     Follow Up In Primary Care - Established; Future  Paroxysmal atrial fibrillation (Multi)  -     Follow Up In Primary Care - Established  Depression, recurrent (CMS-HCC)  Generalized anxiety disorder  Overweight with body mass index (BMI) of 28 to 28.9 in adult  NSCLC of left lung (Multi)  Laryngeal cancer (Multi)  Perennial allergic rhinitis  History of repair of hiatal hernia  Rheumatoid arthritis involving multiple sites, unspecified whether rheumatoid factor present (Multi)  Moderate persistent asthma with acute exacerbation ([facility]-HCC)  Iron deficiency anemia secondary to inadequate dietary  iron intake  Muscle spasm  -     diazePAM (Valium) 5 mg tablet; Take 1 tablet (5 mg) by mouth 2 times a day as needed for anxiety.  Anxiety  -     diazePAM (Valium) 5 mg tablet; Take 1 tablet (5 mg) by mouth 2 times a day as needed for anxiety.

## 2024-12-13 ENCOUNTER — PHARMACY VISIT (OUTPATIENT)
Dept: PHARMACY | Facility: CLINIC | Age: 69
End: 2024-12-13
Payer: MEDICARE

## 2024-12-19 PROCEDURE — RXMED WILLOW AMBULATORY MEDICATION CHARGE

## 2024-12-20 ENCOUNTER — PHARMACY VISIT (OUTPATIENT)
Dept: PHARMACY | Facility: CLINIC | Age: 69
End: 2024-12-20
Payer: MEDICARE

## 2025-02-04 ENCOUNTER — PHARMACY VISIT (OUTPATIENT)
Dept: PHARMACY | Facility: CLINIC | Age: 70
End: 2025-02-04
Payer: MEDICARE

## 2025-02-04 PROCEDURE — RXMED WILLOW AMBULATORY MEDICATION CHARGE

## 2025-02-04 RX ORDER — MORPHINE SULFATE 15 MG/1
TABLET, FILM COATED, EXTENDED RELEASE ORAL
Qty: 56 TABLET | Refills: 0 | OUTPATIENT
Start: 2025-02-04

## 2025-02-04 RX ORDER — MORPHINE SULFATE 15 MG/1
TABLET, FILM COATED, EXTENDED RELEASE ORAL
Qty: 56 TABLET | Refills: 0 | OUTPATIENT
Start: 2025-03-04

## 2025-02-04 RX ORDER — HYDROCODONE BITARTRATE AND ACETAMINOPHEN 7.5; 325 MG/1; MG/1
TABLET ORAL
Qty: 84 TABLET | Refills: 0 | OUTPATIENT
Start: 2025-02-04

## 2025-02-04 RX ORDER — HYDROCODONE BITARTRATE AND ACETAMINOPHEN 7.5; 325 MG/1; MG/1
TABLET ORAL
Qty: 84 TABLET | Refills: 0 | OUTPATIENT
Start: 2025-03-04

## 2025-02-27 ENCOUNTER — PHARMACY VISIT (OUTPATIENT)
Dept: PHARMACY | Facility: CLINIC | Age: 70
End: 2025-02-27
Payer: MEDICARE

## 2025-02-27 PROCEDURE — RXMED WILLOW AMBULATORY MEDICATION CHARGE

## 2025-02-27 RX ORDER — DIAZEPAM 5 MG/1
TABLET ORAL
Qty: 30 TABLET | Refills: 0 | OUTPATIENT
Start: 2025-02-27

## 2025-02-27 RX ORDER — LOSARTAN POTASSIUM 50 MG/1
50 TABLET ORAL DAILY
Qty: 30 TABLET | Refills: 0 | OUTPATIENT
Start: 2025-02-27

## 2025-03-04 PROCEDURE — RXMED WILLOW AMBULATORY MEDICATION CHARGE

## 2025-03-07 ENCOUNTER — PHARMACY VISIT (OUTPATIENT)
Dept: PHARMACY | Facility: CLINIC | Age: 70
End: 2025-03-07
Payer: MEDICARE

## 2025-03-18 PROCEDURE — RXMED WILLOW AMBULATORY MEDICATION CHARGE

## 2025-03-20 PROCEDURE — RXMED WILLOW AMBULATORY MEDICATION CHARGE

## 2025-03-25 ENCOUNTER — PHARMACY VISIT (OUTPATIENT)
Dept: PHARMACY | Facility: CLINIC | Age: 70
End: 2025-03-25
Payer: MEDICARE

## 2025-04-01 ENCOUNTER — PHARMACY VISIT (OUTPATIENT)
Dept: PHARMACY | Facility: CLINIC | Age: 70
End: 2025-04-01
Payer: MEDICARE

## 2025-04-01 PROCEDURE — RXMED WILLOW AMBULATORY MEDICATION CHARGE

## 2025-04-01 RX ORDER — HYDROCODONE BITARTRATE AND ACETAMINOPHEN 7.5; 325 MG/1; MG/1
TABLET ORAL
Qty: 84 TABLET | Refills: 0 | OUTPATIENT
Start: 2025-04-29

## 2025-04-01 RX ORDER — MORPHINE SULFATE 15 MG/1
TABLET, FILM COATED, EXTENDED RELEASE ORAL
Qty: 56 TABLET | Refills: 0 | OUTPATIENT
Start: 2025-04-29

## 2025-04-01 RX ORDER — HYDROCODONE BITARTRATE AND ACETAMINOPHEN 7.5; 325 MG/1; MG/1
TABLET ORAL
Qty: 84 TABLET | Refills: 0 | OUTPATIENT
Start: 2025-04-01

## 2025-04-01 RX ORDER — MORPHINE SULFATE 15 MG/1
TABLET, FILM COATED, EXTENDED RELEASE ORAL
Qty: 56 TABLET | Refills: 0 | OUTPATIENT
Start: 2025-04-01

## 2025-04-20 PROCEDURE — RXMED WILLOW AMBULATORY MEDICATION CHARGE

## 2025-04-29 ENCOUNTER — PHARMACY VISIT (OUTPATIENT)
Dept: PHARMACY | Facility: CLINIC | Age: 70
End: 2025-04-29
Payer: MEDICARE

## 2025-04-29 PROCEDURE — RXMED WILLOW AMBULATORY MEDICATION CHARGE

## 2025-04-29 PROCEDURE — RXOTC WILLOW AMBULATORY OTC CHARGE

## 2025-06-02 ENCOUNTER — PHARMACY VISIT (OUTPATIENT)
Dept: PHARMACY | Facility: CLINIC | Age: 70
End: 2025-06-02
Payer: MEDICARE

## 2025-06-02 PROCEDURE — RXMED WILLOW AMBULATORY MEDICATION CHARGE

## 2025-06-02 PROCEDURE — RXOTC WILLOW AMBULATORY OTC CHARGE

## 2025-06-02 RX ORDER — HYDROCODONE BITARTRATE AND ACETAMINOPHEN 7.5; 325 MG/1; MG/1
TABLET ORAL
Qty: 84 TABLET | Refills: 0 | OUTPATIENT
Start: 2025-06-02

## 2025-06-02 RX ORDER — MORPHINE SULFATE 15 MG/1
TABLET, FILM COATED, EXTENDED RELEASE ORAL
Qty: 56 TABLET | Refills: 0 | OUTPATIENT
Start: 2025-06-30

## 2025-06-02 RX ORDER — MORPHINE SULFATE 15 MG/1
TABLET, FILM COATED, EXTENDED RELEASE ORAL
Qty: 56 TABLET | Refills: 0 | OUTPATIENT
Start: 2025-06-02

## 2025-06-02 RX ORDER — HYDROCODONE BITARTRATE AND ACETAMINOPHEN 7.5; 325 MG/1; MG/1
TABLET ORAL
Qty: 84 TABLET | Refills: 0 | OUTPATIENT
Start: 2025-06-30

## 2025-06-30 PROCEDURE — RXMED WILLOW AMBULATORY MEDICATION CHARGE

## 2025-07-02 ENCOUNTER — PHARMACY VISIT (OUTPATIENT)
Dept: PHARMACY | Facility: CLINIC | Age: 70
End: 2025-07-02
Payer: MEDICARE

## 2025-07-28 ENCOUNTER — PHARMACY VISIT (OUTPATIENT)
Dept: PHARMACY | Facility: CLINIC | Age: 70
End: 2025-07-28
Payer: MEDICARE

## 2025-07-28 PROCEDURE — RXMED WILLOW AMBULATORY MEDICATION CHARGE

## 2025-07-28 RX ORDER — MORPHINE SULFATE 15 MG/1
TABLET, FILM COATED, EXTENDED RELEASE ORAL
Qty: 56 TABLET | Refills: 0 | OUTPATIENT
Start: 2025-08-25

## 2025-07-28 RX ORDER — HYDROCODONE BITARTRATE AND ACETAMINOPHEN 7.5; 325 MG/1; MG/1
1 TABLET ORAL
Qty: 84 TABLET | Refills: 0 | OUTPATIENT
Start: 2025-07-28

## 2025-07-28 RX ORDER — MORPHINE SULFATE 15 MG/1
TABLET, FILM COATED, EXTENDED RELEASE ORAL
Qty: 56 TABLET | Refills: 0 | OUTPATIENT
Start: 2025-07-28

## 2025-07-28 RX ORDER — HYDROCODONE BITARTRATE AND ACETAMINOPHEN 7.5; 325 MG/1; MG/1
1 TABLET ORAL
Qty: 84 TABLET | Refills: 0 | OUTPATIENT
Start: 2025-08-25

## 2025-08-25 PROCEDURE — RXMED WILLOW AMBULATORY MEDICATION CHARGE

## 2025-08-26 ENCOUNTER — PHARMACY VISIT (OUTPATIENT)
Dept: PHARMACY | Facility: CLINIC | Age: 70
End: 2025-08-26
Payer: MEDICARE